# Patient Record
Sex: FEMALE | Race: AMERICAN INDIAN OR ALASKA NATIVE | Employment: FULL TIME | URBAN - METROPOLITAN AREA
[De-identification: names, ages, dates, MRNs, and addresses within clinical notes are randomized per-mention and may not be internally consistent; named-entity substitution may affect disease eponyms.]

---

## 2018-06-01 ENCOUNTER — TRANSFERRED RECORDS (OUTPATIENT)
Dept: HEALTH INFORMATION MANAGEMENT | Facility: CLINIC | Age: 43
End: 2018-06-01

## 2018-08-22 ENCOUNTER — TRANSFERRED RECORDS (OUTPATIENT)
Dept: HEALTH INFORMATION MANAGEMENT | Facility: CLINIC | Age: 43
End: 2018-08-22

## 2018-11-14 ENCOUNTER — TRANSFERRED RECORDS (OUTPATIENT)
Dept: HEALTH INFORMATION MANAGEMENT | Facility: CLINIC | Age: 43
End: 2018-11-14

## 2019-01-23 ENCOUNTER — TRANSFERRED RECORDS (OUTPATIENT)
Dept: HEALTH INFORMATION MANAGEMENT | Facility: CLINIC | Age: 44
End: 2019-01-23

## 2019-03-21 ENCOUNTER — TRANSFERRED RECORDS (OUTPATIENT)
Dept: HEALTH INFORMATION MANAGEMENT | Facility: CLINIC | Age: 44
End: 2019-03-21

## 2019-03-22 ENCOUNTER — TRANSFERRED RECORDS (OUTPATIENT)
Dept: HEALTH INFORMATION MANAGEMENT | Facility: CLINIC | Age: 44
End: 2019-03-22

## 2019-03-23 ENCOUNTER — TRANSFERRED RECORDS (OUTPATIENT)
Dept: HEALTH INFORMATION MANAGEMENT | Facility: CLINIC | Age: 44
End: 2019-03-23

## 2019-04-04 ENCOUNTER — APPOINTMENT (OUTPATIENT)
Dept: CT IMAGING | Facility: CLINIC | Age: 44
DRG: 392 | End: 2019-04-04
Attending: OBSTETRICS & GYNECOLOGY
Payer: COMMERCIAL

## 2019-04-04 ENCOUNTER — ONCOLOGY VISIT (OUTPATIENT)
Dept: ONCOLOGY | Facility: CLINIC | Age: 44
DRG: 392 | End: 2019-04-04
Attending: OBSTETRICS & GYNECOLOGY
Payer: COMMERCIAL

## 2019-04-04 ENCOUNTER — APPOINTMENT (OUTPATIENT)
Dept: GENERAL RADIOLOGY | Facility: CLINIC | Age: 44
DRG: 392 | End: 2019-04-04
Attending: OBSTETRICS & GYNECOLOGY
Payer: COMMERCIAL

## 2019-04-04 ENCOUNTER — DOCUMENTATION ONLY (OUTPATIENT)
Dept: ONCOLOGY | Facility: CLINIC | Age: 44
End: 2019-04-04

## 2019-04-04 ENCOUNTER — HOSPITAL ENCOUNTER (INPATIENT)
Facility: CLINIC | Age: 44
LOS: 4 days | Discharge: HOME | DRG: 392 | End: 2019-04-08
Attending: OBSTETRICS & GYNECOLOGY | Admitting: OBSTETRICS & GYNECOLOGY
Payer: COMMERCIAL

## 2019-04-04 ENCOUNTER — TELEPHONE (OUTPATIENT)
Dept: ONCOLOGY | Facility: CLINIC | Age: 44
End: 2019-04-04

## 2019-04-04 VITALS
HEIGHT: 67 IN | WEIGHT: 144.7 LBS | DIASTOLIC BLOOD PRESSURE: 105 MMHG | SYSTOLIC BLOOD PRESSURE: 139 MMHG | BODY MASS INDEX: 22.71 KG/M2 | OXYGEN SATURATION: 98 % | HEART RATE: 70 BPM | TEMPERATURE: 98.1 F

## 2019-04-04 DIAGNOSIS — K56.609 INTESTINAL OBSTRUCTION, UNSPECIFIED CAUSE, UNSPECIFIED WHETHER PARTIAL OR COMPLETE (H): ICD-10-CM

## 2019-04-04 DIAGNOSIS — N80.9 ENDOMETRIOSIS: Primary | ICD-10-CM

## 2019-04-04 DIAGNOSIS — R11.2 NAUSEA AND VOMITING, INTRACTABILITY OF VOMITING NOT SPECIFIED, UNSPECIFIED VOMITING TYPE: Primary | ICD-10-CM

## 2019-04-04 LAB
ALBUMIN SERPL-MCNC: 3.6 G/DL (ref 3.4–5)
ALP SERPL-CCNC: 84 U/L (ref 40–150)
ALT SERPL W P-5'-P-CCNC: 16 U/L (ref 0–50)
ANION GAP SERPL CALCULATED.3IONS-SCNC: 9 MMOL/L (ref 3–14)
AST SERPL W P-5'-P-CCNC: 17 U/L (ref 0–45)
BASOPHILS # BLD AUTO: 0 10E9/L (ref 0–0.2)
BASOPHILS NFR BLD AUTO: 0.3 %
BILIRUB SERPL-MCNC: 0.8 MG/DL (ref 0.2–1.3)
BUN SERPL-MCNC: 12 MG/DL (ref 7–30)
CALCIUM SERPL-MCNC: 8.8 MG/DL (ref 8.5–10.1)
CHLORIDE SERPL-SCNC: 107 MMOL/L (ref 94–109)
CO2 SERPL-SCNC: 22 MMOL/L (ref 20–32)
CREAT SERPL-MCNC: 0.67 MG/DL (ref 0.52–1.04)
DIFFERENTIAL METHOD BLD: NORMAL
EOSINOPHIL # BLD AUTO: 0.2 10E9/L (ref 0–0.7)
EOSINOPHIL NFR BLD AUTO: 2.3 %
ERYTHROCYTE [DISTWIDTH] IN BLOOD BY AUTOMATED COUNT: 12.7 % (ref 10–15)
GFR SERPL CREATININE-BSD FRML MDRD: >90 ML/MIN/{1.73_M2}
GLUCOSE SERPL-MCNC: 100 MG/DL (ref 70–99)
HCT VFR BLD AUTO: 41.6 % (ref 35–47)
HGB BLD-MCNC: 13.5 G/DL (ref 11.7–15.7)
IMM GRANULOCYTES # BLD: 0.1 10E9/L (ref 0–0.4)
IMM GRANULOCYTES NFR BLD: 0.5 %
LYMPHOCYTES # BLD AUTO: 3.7 10E9/L (ref 0.8–5.3)
LYMPHOCYTES NFR BLD AUTO: 38.1 %
MCH RBC QN AUTO: 29 PG (ref 26.5–33)
MCHC RBC AUTO-ENTMCNC: 32.5 G/DL (ref 31.5–36.5)
MCV RBC AUTO: 89 FL (ref 78–100)
MONOCYTES # BLD AUTO: 0.6 10E9/L (ref 0–1.3)
MONOCYTES NFR BLD AUTO: 5.9 %
NEUTROPHILS # BLD AUTO: 5.1 10E9/L (ref 1.6–8.3)
NEUTROPHILS NFR BLD AUTO: 52.9 %
NRBC # BLD AUTO: 0 10*3/UL
NRBC BLD AUTO-RTO: 0 /100
PLATELET # BLD AUTO: 312 10E9/L (ref 150–450)
POTASSIUM SERPL-SCNC: 3.5 MMOL/L (ref 3.4–5.3)
PROT SERPL-MCNC: 7.3 G/DL (ref 6.8–8.8)
RBC # BLD AUTO: 4.66 10E12/L (ref 3.8–5.2)
SODIUM SERPL-SCNC: 138 MMOL/L (ref 133–144)
WBC # BLD AUTO: 9.6 10E9/L (ref 4–11)

## 2019-04-04 PROCEDURE — 12000001 ZZH R&B MED SURG/OB UMMC

## 2019-04-04 PROCEDURE — 40000556 ZZH STATISTIC PERIPHERAL IV START W US GUIDANCE

## 2019-04-04 PROCEDURE — 80053 COMPREHEN METABOLIC PANEL: CPT | Performed by: STUDENT IN AN ORGANIZED HEALTH CARE EDUCATION/TRAINING PROGRAM

## 2019-04-04 PROCEDURE — 25800030 ZZH RX IP 258 OP 636: Performed by: STUDENT IN AN ORGANIZED HEALTH CARE EDUCATION/TRAINING PROGRAM

## 2019-04-04 PROCEDURE — 99205 OFFICE O/P NEW HI 60 MIN: CPT | Mod: ZP | Performed by: OBSTETRICS & GYNECOLOGY

## 2019-04-04 PROCEDURE — 86901 BLOOD TYPING SEROLOGIC RH(D): CPT | Performed by: STUDENT IN AN ORGANIZED HEALTH CARE EDUCATION/TRAINING PROGRAM

## 2019-04-04 PROCEDURE — 25000128 H RX IP 250 OP 636: Performed by: STUDENT IN AN ORGANIZED HEALTH CARE EDUCATION/TRAINING PROGRAM

## 2019-04-04 PROCEDURE — 86900 BLOOD TYPING SEROLOGIC ABO: CPT | Performed by: STUDENT IN AN ORGANIZED HEALTH CARE EDUCATION/TRAINING PROGRAM

## 2019-04-04 PROCEDURE — 86850 RBC ANTIBODY SCREEN: CPT | Performed by: STUDENT IN AN ORGANIZED HEALTH CARE EDUCATION/TRAINING PROGRAM

## 2019-04-04 PROCEDURE — 99221 1ST HOSP IP/OBS SF/LOW 40: CPT | Mod: AI | Performed by: OBSTETRICS & GYNECOLOGY

## 2019-04-04 PROCEDURE — 36415 COLL VENOUS BLD VENIPUNCTURE: CPT | Performed by: STUDENT IN AN ORGANIZED HEALTH CARE EDUCATION/TRAINING PROGRAM

## 2019-04-04 PROCEDURE — 40000986 XR ABDOMEN PORT 1 VW

## 2019-04-04 PROCEDURE — 74177 CT ABD & PELVIS W/CONTRAST: CPT

## 2019-04-04 PROCEDURE — 71275 CT ANGIOGRAPHY CHEST: CPT

## 2019-04-04 PROCEDURE — 85025 COMPLETE CBC W/AUTO DIFF WBC: CPT | Performed by: STUDENT IN AN ORGANIZED HEALTH CARE EDUCATION/TRAINING PROGRAM

## 2019-04-04 RX ORDER — LIDOCAINE 40 MG/G
CREAM TOPICAL
Status: DISCONTINUED | OUTPATIENT
Start: 2019-04-04 | End: 2019-04-08 | Stop reason: HOSPADM

## 2019-04-04 RX ORDER — NALOXONE HYDROCHLORIDE 0.4 MG/ML
.1-.4 INJECTION, SOLUTION INTRAMUSCULAR; INTRAVENOUS; SUBCUTANEOUS
Status: DISCONTINUED | OUTPATIENT
Start: 2019-04-04 | End: 2019-04-05

## 2019-04-04 RX ORDER — ONDANSETRON 4 MG/1
4 TABLET, ORALLY DISINTEGRATING ORAL EVERY 6 HOURS PRN
Status: DISCONTINUED | OUTPATIENT
Start: 2019-04-04 | End: 2019-04-08 | Stop reason: HOSPADM

## 2019-04-04 RX ORDER — HYDROMORPHONE HCL/0.9% NACL/PF 0.2MG/0.2
0.2 SYRINGE (ML) INTRAVENOUS
Status: DISCONTINUED | OUTPATIENT
Start: 2019-04-04 | End: 2019-04-05

## 2019-04-04 RX ORDER — LEVOTHYROXINE SODIUM 75 UG/1
75 TABLET ORAL
Status: DISCONTINUED | OUTPATIENT
Start: 2019-04-05 | End: 2019-04-08 | Stop reason: HOSPADM

## 2019-04-04 RX ORDER — ATENOLOL 50 MG/1
50 TABLET ORAL DAILY
Status: DISCONTINUED | OUTPATIENT
Start: 2019-04-05 | End: 2019-04-08 | Stop reason: HOSPADM

## 2019-04-04 RX ORDER — LORAZEPAM 2 MG/ML
0.5 INJECTION INTRAMUSCULAR
Status: COMPLETED | OUTPATIENT
Start: 2019-04-04 | End: 2019-04-04

## 2019-04-04 RX ORDER — LEVOTHYROXINE SODIUM 75 UG/1
75 TABLET ORAL
COMMUNITY

## 2019-04-04 RX ORDER — HYDROCODONE BITARTRATE AND ACETAMINOPHEN 5; 325 MG/1; MG/1
TABLET ORAL
Refills: 0 | Status: ON HOLD | COMMUNITY
Start: 2018-07-09 | End: 2019-04-08

## 2019-04-04 RX ORDER — ATENOLOL 50 MG/1
50 TABLET ORAL DAILY
COMMUNITY

## 2019-04-04 RX ORDER — ONDANSETRON 2 MG/ML
4 INJECTION INTRAMUSCULAR; INTRAVENOUS EVERY 6 HOURS PRN
Status: DISCONTINUED | OUTPATIENT
Start: 2019-04-04 | End: 2019-04-08 | Stop reason: HOSPADM

## 2019-04-04 RX ORDER — IOPAMIDOL 755 MG/ML
86 INJECTION, SOLUTION INTRAVASCULAR ONCE
Status: COMPLETED | OUTPATIENT
Start: 2019-04-04 | End: 2019-04-04

## 2019-04-04 RX ADMIN — ONDANSETRON 4 MG: 2 INJECTION INTRAMUSCULAR; INTRAVENOUS at 20:15

## 2019-04-04 RX ADMIN — Medication 0.2 MG: at 22:51

## 2019-04-04 RX ADMIN — IOPAMIDOL 86 ML: 755 INJECTION, SOLUTION INTRAVENOUS at 22:35

## 2019-04-04 RX ADMIN — DEXTROSE AND SODIUM CHLORIDE: 5; 900 INJECTION, SOLUTION INTRAVENOUS at 22:45

## 2019-04-04 RX ADMIN — LORAZEPAM 0.5 MG: 2 INJECTION INTRAMUSCULAR; INTRAVENOUS at 20:15

## 2019-04-04 RX ADMIN — Medication 0.2 MG: at 20:15

## 2019-04-04 SDOH — HEALTH STABILITY: MENTAL HEALTH: HOW OFTEN DO YOU HAVE A DRINK CONTAINING ALCOHOL?: NEVER

## 2019-04-04 ASSESSMENT — ACTIVITIES OF DAILY LIVING (ADL)
ADLS_ACUITY_SCORE: 11
TRANSFERRING: 0-->INDEPENDENT
DRESS: 0-->INDEPENDENT
AMBULATION: 0-->INDEPENDENT
RETIRED_EATING: 0-->INDEPENDENT
SWALLOWING: 0-->SWALLOWS FOODS/LIQUIDS WITHOUT DIFFICULTY
BATHING: 0-->INDEPENDENT
TOILETING: 0-->INDEPENDENT
FALL_HISTORY_WITHIN_LAST_SIX_MONTHS: NO
COGNITION: 0 - NO COGNITION ISSUES REPORTED
RETIRED_COMMUNICATION: 0-->UNDERSTANDS/COMMUNICATES WITHOUT DIFFICULTY

## 2019-04-04 ASSESSMENT — MIFFLIN-ST. JEOR
SCORE: 1341.74
SCORE: 1346.59

## 2019-04-04 ASSESSMENT — PAIN SCALES - GENERAL: PAINLEVEL: EXTREME PAIN (8)

## 2019-04-04 NOTE — NURSING NOTE
"Oncology Rooming Note    April 4, 2019 4:16 PM   Clarissa Myles is a 43 year old female who presents for:    Chief Complaint   Patient presents with     Oncology Clinic Visit     New; Endometriosis     Initial Vitals: BP (!) 139/105   Pulse 70   Temp 98.1  F (36.7  C) (Oral)   Ht 1.69 m (5' 6.54\")   Wt 65.6 kg (144 lb 11.2 oz)   SpO2 98%   BMI 22.98 kg/m   Estimated body mass index is 22.98 kg/m  as calculated from the following:    Height as of this encounter: 1.69 m (5' 6.54\").    Weight as of this encounter: 65.6 kg (144 lb 11.2 oz). Body surface area is 1.75 meters squared.  Extreme Pain (8) Comment: Data Unavailable   No LMP recorded.  Allergies reviewed: Yes  Medications reviewed: Yes    Medications: Medication refills not needed today.  Pharmacy name entered into EPIC: [facility]      Clinical concerns: Pt here to discuss possible surgery. Dr Franco was notified.      Adeline Pierre CMA              "

## 2019-04-04 NOTE — PROGRESS NOTES
Consult Notes on Referred Patient    Date: 2019     Dr. Deras Not In System      RE: Clarissa Myles  : 1975  SARAH: 2019    Dear  Provider Not In System:    I had the pleasure of seeing your patient Clarissa Myles here at the Gynecologic Cancer Clinic at the [facility] on 2019.  She presents with her brother and sister-in-law for the consult.    She is anxious and teary complaining of abdominal pain and fatigue.    Her history is notable for long standing diagnosis of pelvic pain and dysmenorrhea. She , her child was born many years ago and she had secondary infertility. Starting early 2018 she began to experience increasing signs of bowel obstruction, a  lower GI endoscopy in summer 2018 was unsuccessful. Ultimately in 2018 she underwent bowel resection and colostomy, diagnosed with endometriosis.      In 2018, she underwent another surgery with take down of the colostomy and additional bowel resection. Extensive endometriosis on her bowel was noted at that time and she was referred to Gynecology. Lupon was started at the end of Dec 2018. She reports her dysmenorrhea has improved since starting the Lupron.  Last week she started to develop nausea and abdominal pain. She was hospitalized at local hospital, placed NPO and ultimately discharged with plan to follow up at the [facility]. There was concern for another developing bowel obstruction thought to be due to extensive endometriosis on her bowel. Due to her surgical history and endometriosis, she was referred to Gyn Oncology at [facility] to discuss surgical management for endometriosis with total hysterectomy and bilateral salpingo-oophorectomy.      Calrissa reports she has nausea today, no vomiting. Cramping abdominal pain is present. No flatus. She was able to eat a few bites of food this AM but has not been eating regularly. She also reports feeling a little short of breath.   Reports she has not  had normal bowel movement for nearly 3 weeks.    Her family reports they are frustrated with seeing her so sick. They drove 8-10 hours today.       Review of Systems:    Systemic           yes - weight loss, fatigue, no fever  Skin           no rashes, or lesions  Eye           no irritation; no changes in vision  Donnell-Laryngeal           no dysphagia; no hoarseness   Pulmonary    no cough; no shortness of breath  Cardiovascular    no chest pain; no palpitations  Gastrointestinal    yes - + nausea,  po intake, unable to tolerate solid foods  Genitourinary   no urinary frequency; no urinary urgency; no dysuria; no pain; no abnormal vaginal discharge; no abnormal vaginal bleeding  Breast   no breast discharge; no breast changes; no breast pain  Musculoskeletal    no myalgias; no arthralgias; no back pain  Psychiatric           yes - anxiety, diagnosis of bipolar disorder currently denies symptoms but not taking medication    Hematologic           no tender lymph nodes; no noticeable swellings or lumps   Endocrine    no hot flashes; no heat/cold intolerance         Neurological   no tremor; no numbness and tingling; no headaches; no difficulty  sleeping      Past Medical History:    Past Medical History:   Diagnosis Date     Asthma      Bipolar disorder (H)      Endometriosis      GERD (gastroesophageal reflux disease)      Hypertension      Hypothyroid          Past Surgical History:    No past surgical history on file. Bowel resection and colostomy takedown 2018      Health Maintenance:  Health Maintenance Due   Topic Date Due     PREVENTIVE CARE VISIT  1975     PHQ-2 Q1 YR  1989     HIV SCREEN (SYSTEM ASSIGNED)  1995     PAP SCREENING Q3 YR (SYSTEM ASSIGNED)  1998     DTAP/TDAP/TD IMMUNIZATION (1 - Tdap) 2002     INFLUENZA VACCINE (1) 2018           Current Medications:     has a current medication list which includes the following prescription(s): atenolol, levothyroxine,  "acetaminophen, docusate sodium, gabapentin, omeprazole, ondansetron, oxycodone, polyethylene glycol, and quetiapine.       Allergies:     No Known Allergies         Social History:     Social History     Tobacco Use     Smoking status: Former Smoker     Smokeless tobacco: Never Used   Substance Use Topics     Alcohol use: No     Frequency: Never       History   Drug Use Not on file         Family History:       No family history on file. Denies family history of breast or ovarian cancer      Physical Exam:     BP (!) 139/105   Pulse 70   Temp 98.1  F (36.7  C) (Oral)   Ht 1.69 m (5' 6.54\")   Wt 65.6 kg (144 lb 11.2 oz)   SpO2 98%   BMI 22.98 kg/m    Body mass index is 22.98 kg/m .    General Appearance:  alert, no distress, fatigued and does not make eye contact     HEENT:  no thyromegaly, no palpable nodules or masses        Cardiovascular: regular rate and rhythm, no gallops, rubs or murmurs     Respiratory: lungs clear, no rales, rhonchi or wheezes, normal diaphragmatic excursion    Musculoskeletal: extremities non tender and without edema    Skin: no lesions or rashes     Neurological: normal gait, no gross defects     Psychiatric: appropriate mood and affect, soft spoken             Gastrointestinal:       Abdomen soft, decreased BS, distended along right aspect of abdomen, no mass      Genitourinary: Deferred      Assessment:    Clarissa Myles is a 43 year old woman with long standing history of endometriosis and bowel obstruction due to endometriosis.    Patient presents with nausea and abdominal discomfort and signs of obstruction.    I discussed in detail with patient and family that we need to further work up the degree of bowel obstruction and need for surgical intervention at this time. If there is a need for surgery, would agree that bilateral oophorectomy is reasonable.     Reviewed risks and benefits of oophorectomy.    Admit to Gyn Oncology service for complete work, hydration and will evaluate " further with coordination with colorectal surgery.        Thank you for allowing us to participate in the care of your patient.         Sincerely,      Jennyfer Franco M.D., MPH,  F.A.C.O.G.  Division of Gynecologic Oncology        A total of 60 minutes was spent with the patient, ? 50% of time was spent in counseling the patient and/or treatment planning.              CC  Patient Care Team:  No Ref-Primary, Physician as PCP - General  PROVIDER NOT IN SYSTEM

## 2019-04-04 NOTE — LETTER
2019       RE: Clarissa Myles  [address]  [facility] [address]     Dear Colleague,    Thank you for referring your patient, Clarissa Myles, to the [facility]. Please see a copy of my visit note below.                            Consult Notes on Referred Patient    Date: 2019      Provider Not In System      RE: Clarissa Myles  : 1975  SARAH: 2019    Dear  Provider Not In System:    I had the pleasure of seeing your patient Clarissa Myles here at the Gynecologic Cancer Clinic at the [facility] on 2019.  She presents with her brother and sister-in-law for the consult.    She is anxious and teary complaining of abdominal pain and fatigue.    Her history is notable for long standing diagnosis of pelvic pain and dysmenorrhea. She , her child was born many years ago and she had secondary infertility. Starting early 2018 she began to experience increasing signs of bowel obstruction, a  lower GI endoscopy in summer 2018 was unsuccessful. Ultimately in 2018 she underwent bowel resection and colostomy, diagnosed with endometriosis.      In 2018, she underwent another surgery with take down of the colostomy and additional bowel resection. Extensive endometriosis on her bowel was noted at that time and she was referred to Gynecology. Lupon was started at the end of Dec 2018. She reports her dysmenorrhea has improved since starting the Lupron.  Last week she started to develop nausea and abdominal pain. She was hospitalized at local hospital, placed NPO and ultimately discharged with plan to follow up at the [facility]. There was concern for another developing bowel obstruction thought to be due to extensive endometriosis on her bowel. Due to her surgical history and endometriosis, she was referred to Gyn Oncology at [facility] to discuss surgical management for endometriosis with total hysterectomy and bilateral salpingo-oophorectomy.      Clarissa reports she has nausea  today, no vomiting. Cramping abdominal pain is present. No flatus. She was able to eat a few bites of food this AM but has not been eating regularly. She also reports feeling a little short of breath.   Reports she has not had normal bowel movement for nearly 3 weeks.    Her family reports they are frustrated with seeing her so sick. They drove 8-10 hours today.       Review of Systems:    Systemic           yes - weight loss, fatigue, no fever  Skin           no rashes, or lesions  Eye           no irritation; no changes in vision  Donnell-Laryngeal           no dysphagia; no hoarseness   Pulmonary    no cough; no shortness of breath  Cardiovascular    no chest pain; no palpitations  Gastrointestinal    yes - + nausea,  po intake, unable to tolerate solid foods  Genitourinary   no urinary frequency; no urinary urgency; no dysuria; no pain; no abnormal vaginal discharge; no abnormal vaginal bleeding  Breast   no breast discharge; no breast changes; no breast pain  Musculoskeletal    no myalgias; no arthralgias; no back pain  Psychiatric           yes - anxiety, diagnosis of bipolar disorder currently denies symptoms but not taking medication    Hematologic           no tender lymph nodes; no noticeable swellings or lumps   Endocrine    no hot flashes; no heat/cold intolerance         Neurological   no tremor; no numbness and tingling; no headaches; no difficulty  sleeping      Past Medical History:    Past Medical History:   Diagnosis Date     Asthma      Bipolar disorder (H)      Endometriosis      GERD (gastroesophageal reflux disease)      Hypertension      Hypothyroid          Past Surgical History:    No past surgical history on file. Bowel resection and colostomy takedown 2018      Health Maintenance:  Health Maintenance Due   Topic Date Due     PREVENTIVE CARE VISIT  1975     PHQ-2 Q1 YR  1989     HIV SCREEN (SYSTEM ASSIGNED)  1995     PAP SCREENING Q3 YR (SYSTEM ASSIGNED)  1998  "    DTAP/TDAP/TD IMMUNIZATION (1 - Tdap) 08/08/2002     INFLUENZA VACCINE (1) 09/01/2018           Current Medications:     has a current medication list which includes the following prescription(s): atenolol, levothyroxine, acetaminophen, docusate sodium, gabapentin, omeprazole, ondansetron, oxycodone, polyethylene glycol, and quetiapine.       Allergies:     No Known Allergies         Social History:     Social History     Tobacco Use     Smoking status: Former Smoker     Smokeless tobacco: Never Used   Substance Use Topics     Alcohol use: No     Frequency: Never       History   Drug Use Not on file         Family History:       No family history on file. Denies family history of breast or ovarian cancer      Physical Exam:     BP (!) 139/105   Pulse 70   Temp 98.1  F (36.7  C) (Oral)   Ht 1.69 m (5' 6.54\")   Wt 65.6 kg (144 lb 11.2 oz)   SpO2 98%   BMI 22.98 kg/m     Body mass index is 22.98 kg/m .    General Appearance:  alert, no distress, fatigued and does not make eye contact     HEENT:  no thyromegaly, no palpable nodules or masses        Cardiovascular: regular rate and rhythm, no gallops, rubs or murmurs     Respiratory: lungs clear, no rales, rhonchi or wheezes, normal diaphragmatic excursion    Musculoskeletal: extremities non tender and without edema    Skin: no lesions or rashes     Neurological: normal gait, no gross defects     Psychiatric: appropriate mood and affect, soft spoken             Gastrointestinal:       Abdomen soft, decreased BS, distended along right aspect of abdomen, no mass      Genitourinary: Deferred      Assessment:    Clarissa Myles is a 43 year old woman with long standing history of endometriosis and bowel obstruction due to endometriosis.    Patient presents with nausea and abdominal discomfort and signs of obstruction.    I discussed in detail with patient and family that we need to further work up the degree of bowel obstruction and need for surgical intervention at " this time. If there is a need for surgery, would agree that bilateral oophorectomy is reasonable.     Reviewed risks and benefits of oophorectomy.    Admit to Gyn Oncology service for complete work, hydration and will evaluate further with coordination with colorectal surgery.        Thank you for allowing us to participate in the care of your patient.         Sincerely,      Jennyfer Franco M.D., MPH,  F.A.C.O.G.  Division of Gynecologic Oncology        A total of 60 minutes was spent with the patient, ? 50% of time was spent in counseling the patient and/or treatment planning.              CC  Patient Care Team:  No Ref-Primary, Physician as PCP - General  PROVIDER NOT IN SYSTEM

## 2019-04-04 NOTE — TELEPHONE ENCOUNTER
Patient seen in clinic by Dr Franco.     Direct admit to 7C at [facility]     Gyn/Onc Fellow aware. 7C workroom updated.     Report called to floor.     Patient updated on plan and directions to the hospital discussed.     Itzel Davis RN

## 2019-04-04 NOTE — LETTER
[address] [address]  [phone]          April 8, 2019    RE:  Clarissa Myles                                                                                                                                                       [address]  [facility] [address]            To whom it may concern:    Clarissa Myles is under Dr Franco's professional care. She was hospitalized from 4/4/19 - 4/8/19. She  may return to work 4/15/19.     Sincerely,         Hansa Amaya DNP, CNP

## 2019-04-05 LAB
ABO + RH BLD: NORMAL
ABO + RH BLD: NORMAL
ALBUMIN SERPL-MCNC: 3.4 G/DL (ref 3.4–5)
ALP SERPL-CCNC: 75 U/L (ref 40–150)
ALT SERPL W P-5'-P-CCNC: 16 U/L (ref 0–50)
ANION GAP SERPL CALCULATED.3IONS-SCNC: 7 MMOL/L (ref 3–14)
AST SERPL W P-5'-P-CCNC: 13 U/L (ref 0–45)
BASOPHILS # BLD AUTO: 0 10E9/L (ref 0–0.2)
BASOPHILS NFR BLD AUTO: 0.3 %
BILIRUB SERPL-MCNC: 0.7 MG/DL (ref 0.2–1.3)
BLD GP AB SCN SERPL QL: NORMAL
BLOOD BANK CMNT PATIENT-IMP: NORMAL
BUN SERPL-MCNC: 10 MG/DL (ref 7–30)
CALCIUM SERPL-MCNC: 8.3 MG/DL (ref 8.5–10.1)
CHLORIDE SERPL-SCNC: 106 MMOL/L (ref 94–109)
CO2 SERPL-SCNC: 24 MMOL/L (ref 20–32)
CREAT SERPL-MCNC: 0.67 MG/DL (ref 0.52–1.04)
DIFFERENTIAL METHOD BLD: NORMAL
EOSINOPHIL # BLD AUTO: 0.2 10E9/L (ref 0–0.7)
EOSINOPHIL NFR BLD AUTO: 1.9 %
ERYTHROCYTE [DISTWIDTH] IN BLOOD BY AUTOMATED COUNT: 12.7 % (ref 10–15)
GFR SERPL CREATININE-BSD FRML MDRD: >90 ML/MIN/{1.73_M2}
GLUCOSE SERPL-MCNC: 102 MG/DL (ref 70–99)
HCT VFR BLD AUTO: 38.2 % (ref 35–47)
HGB BLD-MCNC: 12.6 G/DL (ref 11.7–15.7)
IMM GRANULOCYTES # BLD: 0 10E9/L (ref 0–0.4)
IMM GRANULOCYTES NFR BLD: 0.5 %
LYMPHOCYTES # BLD AUTO: 3.2 10E9/L (ref 0.8–5.3)
LYMPHOCYTES NFR BLD AUTO: 35.9 %
MCH RBC QN AUTO: 29.2 PG (ref 26.5–33)
MCHC RBC AUTO-ENTMCNC: 33 G/DL (ref 31.5–36.5)
MCV RBC AUTO: 89 FL (ref 78–100)
MONOCYTES # BLD AUTO: 0.4 10E9/L (ref 0–1.3)
MONOCYTES NFR BLD AUTO: 4.7 %
NEUTROPHILS # BLD AUTO: 5 10E9/L (ref 1.6–8.3)
NEUTROPHILS NFR BLD AUTO: 56.7 %
NRBC # BLD AUTO: 0 10*3/UL
NRBC BLD AUTO-RTO: 0 /100
PLATELET # BLD AUTO: 290 10E9/L (ref 150–450)
POTASSIUM SERPL-SCNC: 3.4 MMOL/L (ref 3.4–5.3)
PROT SERPL-MCNC: 6.8 G/DL (ref 6.8–8.8)
RBC # BLD AUTO: 4.31 10E12/L (ref 3.8–5.2)
SODIUM SERPL-SCNC: 137 MMOL/L (ref 133–144)
SPECIMEN EXP DATE BLD: NORMAL
WBC # BLD AUTO: 8.8 10E9/L (ref 4–11)

## 2019-04-05 PROCEDURE — C9113 INJ PANTOPRAZOLE SODIUM, VIA: HCPCS | Performed by: NURSE PRACTITIONER

## 2019-04-05 PROCEDURE — 25000128 H RX IP 250 OP 636: Performed by: NURSE PRACTITIONER

## 2019-04-05 PROCEDURE — 12000001 ZZH R&B MED SURG/OB UMMC

## 2019-04-05 PROCEDURE — 25000132 ZZH RX MED GY IP 250 OP 250 PS 637: Performed by: STUDENT IN AN ORGANIZED HEALTH CARE EDUCATION/TRAINING PROGRAM

## 2019-04-05 PROCEDURE — 25000128 H RX IP 250 OP 636: Performed by: STUDENT IN AN ORGANIZED HEALTH CARE EDUCATION/TRAINING PROGRAM

## 2019-04-05 PROCEDURE — 85025 COMPLETE CBC W/AUTO DIFF WBC: CPT | Performed by: STUDENT IN AN ORGANIZED HEALTH CARE EDUCATION/TRAINING PROGRAM

## 2019-04-05 PROCEDURE — 25800030 ZZH RX IP 258 OP 636: Performed by: STUDENT IN AN ORGANIZED HEALTH CARE EDUCATION/TRAINING PROGRAM

## 2019-04-05 PROCEDURE — 99232 SBSQ HOSP IP/OBS MODERATE 35: CPT | Mod: GC | Performed by: OBSTETRICS & GYNECOLOGY

## 2019-04-05 PROCEDURE — 25000128 H RX IP 250 OP 636: Performed by: OBSTETRICS & GYNECOLOGY

## 2019-04-05 PROCEDURE — 80053 COMPREHEN METABOLIC PANEL: CPT | Performed by: STUDENT IN AN ORGANIZED HEALTH CARE EDUCATION/TRAINING PROGRAM

## 2019-04-05 PROCEDURE — 36415 COLL VENOUS BLD VENIPUNCTURE: CPT | Performed by: STUDENT IN AN ORGANIZED HEALTH CARE EDUCATION/TRAINING PROGRAM

## 2019-04-05 RX ORDER — DIPHENHYDRAMINE HCL 25 MG
25 CAPSULE ORAL EVERY 6 HOURS PRN
Status: DISCONTINUED | OUTPATIENT
Start: 2019-04-05 | End: 2019-04-08 | Stop reason: HOSPADM

## 2019-04-05 RX ORDER — DIPHENHYDRAMINE HYDROCHLORIDE 50 MG/ML
25 INJECTION INTRAMUSCULAR; INTRAVENOUS EVERY 6 HOURS PRN
Status: DISCONTINUED | OUTPATIENT
Start: 2019-04-05 | End: 2019-04-05

## 2019-04-05 RX ORDER — NALOXONE HYDROCHLORIDE 0.4 MG/ML
.1-.4 INJECTION, SOLUTION INTRAMUSCULAR; INTRAVENOUS; SUBCUTANEOUS
Status: DISCONTINUED | OUTPATIENT
Start: 2019-04-05 | End: 2019-04-08 | Stop reason: HOSPADM

## 2019-04-05 RX ADMIN — ENOXAPARIN SODIUM 40 MG: 40 INJECTION SUBCUTANEOUS at 20:57

## 2019-04-05 RX ADMIN — DEXTROSE AND SODIUM CHLORIDE: 5; 900 INJECTION, SOLUTION INTRAVENOUS at 18:43

## 2019-04-05 RX ADMIN — PANTOPRAZOLE SODIUM 40 MG: 40 INJECTION, POWDER, FOR SOLUTION INTRAVENOUS at 17:18

## 2019-04-05 RX ADMIN — Medication 1 MG: at 01:50

## 2019-04-05 RX ADMIN — DEXTROSE AND SODIUM CHLORIDE: 5; 900 INJECTION, SOLUTION INTRAVENOUS at 08:26

## 2019-04-05 RX ADMIN — ONDANSETRON 4 MG: 2 INJECTION INTRAMUSCULAR; INTRAVENOUS at 17:51

## 2019-04-05 RX ADMIN — Medication 0.2 MG: at 01:32

## 2019-04-05 RX ADMIN — Medication 1 MG: at 20:51

## 2019-04-05 RX ADMIN — BENZOCAINE, MENTHOL 1 LOZENGE: 15; 3.6 LOZENGE ORAL at 15:48

## 2019-04-05 RX ADMIN — Medication: at 14:00

## 2019-04-05 RX ADMIN — Medication 0.2 MG: at 03:44

## 2019-04-05 RX ADMIN — ONDANSETRON 4 MG: 2 INJECTION INTRAMUSCULAR; INTRAVENOUS at 02:26

## 2019-04-05 RX ADMIN — DIPHENHYDRAMINE HYDROCHLORIDE 25 MG: 50 INJECTION INTRAMUSCULAR; INTRAVENOUS at 15:09

## 2019-04-05 RX ADMIN — ATENOLOL 50 MG: 50 TABLET ORAL at 08:01

## 2019-04-05 RX ADMIN — LEVOTHYROXINE SODIUM 75 MCG: 75 TABLET ORAL at 08:01

## 2019-04-05 RX ADMIN — Medication: at 09:38

## 2019-04-05 RX ADMIN — Medication 0.2 MG: at 06:26

## 2019-04-05 RX ADMIN — Medication 0.2 MG: at 08:01

## 2019-04-05 RX ADMIN — PROCHLORPERAZINE EDISYLATE 5 MG: 5 INJECTION INTRAMUSCULAR; INTRAVENOUS at 20:51

## 2019-04-05 ASSESSMENT — PAIN DESCRIPTION - DESCRIPTORS
DESCRIPTORS: CRAMPING
DESCRIPTORS: CRAMPING

## 2019-04-05 ASSESSMENT — ACTIVITIES OF DAILY LIVING (ADL)
ADLS_ACUITY_SCORE: 11

## 2019-04-05 ASSESSMENT — MIFFLIN-ST. JEOR: SCORE: 1339.92

## 2019-04-05 NOTE — H&P
[facility] History and Physical    Clarissa Myles MRN# [id number]   Age: 43 year old YOB: 1975     Date of Admission:  4/4/2019    Primary care provider: Megan Zhao             Chief Complaint:   Nausea, vomiting and abdominal pain         History of Present Illness:   Clarissa Myles is a 43 year old female admitted with nausea, vomiting and abdominal pain suggestive of a small bowel obstruction. She is accompanied by her bother and sister in law. They are at the bedside and supportive.     Clarissa was seen in Dr. Franco's clinic today for evaluation of a total hysterectomy and bilateral salpingo-oophorectomy. Her gyn history is pertinent for endometriosis and chronic pelvic pain. She originally presented to an OSH in June 2018 with a bowel obstruction. Surgical management was recommended and she underwent a bowel resection and colostomy. In Nov 2018, she underwent another surgery with take down of the colostomy and additional bowel resection. Extensive endometriosis on her bowel was noted at that time and she was referred to Gynecology. Lupon was started at the end of Dec 2018. Last week she started to develop nausea and abdominal pain. There was concern for another developing bowel obstruction thought to be due to extensive endometriosis on her bowel. Due to her surgical history and endometriosis, she was referred to Gyn Oncology at [facility] to discuss surgical management for endometriosis with total hysterectomy and bilateral salpingo-oophorectomy.     Clarissa reports she has nausea today, no vomiting. Cramping abdominal pain is present. No flatus. She was able to eat a few bites of food this AM but has not been eating regularly. She also reports feeling a little short of breath.          Cancer Treatment History:     No cancer treatment          Past Medical History:   HTN, hypothyroidism, anxiety, GERD, endometriosis with chronic pelvic pain, asthma         Past Surgical History:   Bowel resection  "with colostomy June 2018  Take down of colostomy and bowel resection Nov 2018         Social History:     Reports smoking 2 cigarettes per day. No alcohol use. She used THC last week for pain control since she did not want to take narcotic pain medication.           Family History:   No family history on file.         Immunizations:     There is no immunization history on file for this patient.         Allergies:   No Known Allergies         Medications:     Levothyroxine, atenolol, norco         Review of Systems:   A 14 point review of systems was completed and was negative except for points mentioned in the HPI.            Physical Exam:     Vitals:    04/04/19 1813   BP: 125/75   Pulse: 85   Resp: 16   Temp: 97.1  F (36.2  C)   TempSrc: Oral   SpO2: 96%   Weight: 64.4 kg (142 lb)   Height: 1.702 m (5' 7\")     General: No acute distress, resting comfortably  CV: RRR, no murmur  Resp: clear to ascultation  Abdomen: well healed vertical midline incision and horizontal 4-5 cm incision at site of previous colostomy. Soft, mildly tender  : deferred  Extremities: well perfused, trace LE edema        Labs and imaging:     CBC, CMP, CT PE, CT abd/pelvis w/ contrast pending       Assessment and Plan:   Assessment:Clarissa Myles is a 43 year old female admitted with nausea, vomiting and abdominal pain suggestive of a small bowel obstruction.    Active Problem List  Endometriosis with pelvic pain  Small bowel obstruction  Hypothyroidism  Hypertension  Anxiety      # Small Bowel Obstruction:  - NPO, IV fluids D5NS 100 ml/hr, NG tube  - CT abd/pelvis with PO contrast   - suspect due to endometriosis implants on the bowel    #Endometriosis w/ pelvic pain:  - Currently on Lupron  - Planning hysterectomy and BSO for management of endometriosis, discussed with patient and she is in agreement. She is aware that she will no long be able to carry a pregnancy.   - Surgery date pending management of small bowel obstruction    # " Shortness of breath:  - CT PE planned    # Hypothyroidism:  - continue home levothyroxine    # Hypertension:  - home atenolol continued    # anxiety:  - no prior to admission medications    Pain: tylenol and IV dilaudid prn  Heme: NI  GI: antiemetics ordered  : spontaneously voiding  ID: NI  PPX: SCDs, lovenox 40 mg daily  Dispo:  Pending clinical course    Patient seen and discussed with Dr. Chago Gaitan, Gyn Onc Fellow.     Shaila Vargas MD PhD  Ob/Gyn PGY-3  4/4/2019 7:36 PM         Provider Disclosure:   I agree with above History, Review of Systems, Physical exam and Plan. I have reviewed the content of the documentation and have edited it as needed. The documentation accurately represents those services and the decisions I have made.     Electronically signed by:   Zhang Umanzor MD   Gynecologic Oncology   [facility]

## 2019-04-05 NOTE — TELEPHONE ENCOUNTER
49 pages of medical records received from [facility] and sent to [facility] urgent scanning at 11:05AM.

## 2019-04-05 NOTE — PLAN OF CARE
AOx4, AVSS on RA, and up ad michael. Abdominal pain controlled with IV dilaudid 0.2 q 2hours given x2 with some relief. Complained of Nausea relieved with zofran. NPO ex meds. NG on LIS. PIV infusing MIIVF at 100ml/hr. Continue to monitor and maintain pain control.

## 2019-04-05 NOTE — PROGRESS NOTES
"SPIRITUAL HEALTH SERVICES  SPIRITUAL ASSESSMENT Progress Note  [facility] 7C     REFERRAL SOURCE: Hospital  request upon admission    Clarissa Myles is at [facility] for a \"bowel block.\" Her understanding from her medical team is \"we need to wait a little and see if the block clears.\" Clarissa is here from [place], [place]. Her bother and sister-in-law drove Clarissa to [facility] and will be staying at a hotel nearby with plans to drive her home following her hospitalization.    Clarissa is [place] from [place] [facility]. She would like to have a traditional smudging ceremony tomorrow at 2 pm with her family present. I have notified the on-call  for tomorrow who will help facilitate this.    PLAN: Smudging ceremony on 4/6/19 with on-call . I will continue to follow for on going spiritual support.    Itzel Parham  Oncology   Pager [phone]    "

## 2019-04-05 NOTE — PROGRESS NOTES
"CLINICAL NUTRITION SERVICES - ASSESSMENT NOTE     Nutrition Prescription    RECOMMENDATIONS FOR MDs/PROVIDERS TO ORDER:  Diet adv v nutrition support within 6-7 days    Malnutrition Status:    Severe malnutrition in the context of acute illness    Recommendations already ordered by Registered Dietitian (RD):  None at this time     Future/Additional Recommendations:  Once able to advance diet, offer supplements and/or scheduled snacks to help increase PO intake.  Monitor weight trends.  Consider MVI with minerals if poor PO intake continues once diet advances.      REASON FOR ASSESSMENT  Clarissa Myles is a/an 43 year old female assessed by the dietitian for Admission Nutrition Risk Screen for unintentional loss of 10# or more in the past two months and reduced oral intake over the last month    NUTRITION HISTORY  Patient on a regular diet PTA.  Has had poor PO intake for the past few weeks and really not eating or drinking much.  Asked pt what she has been trying to eat, but pt did not give any specifics and reiterated that she's just been eating bites of food here and there.     Pt admit with nausea/vomiting and abdominal pain and found to have bowel obstruction. PMH includes endometriosis with chronic pelvic pain.  Pt had a bowel resection with colostomy June 2018 and then colostomy takedown and bowel resection Nov 2018.    CURRENT NUTRITION ORDERS  Diet: NPO  Intake/Tolerance: NPO with NG tube in place    LABS  Labs reviewed    MEDICATIONS  Medications reviewed  - D5 @ 100 mL/hr    ANTHROPOMETRICS  Height: 170.2 cm (5' 7\")  Most Recent Weight: 64.2 kg (141 lb 9.6 oz)    IBW: 61.4 kg   BMI: Normal BMI  Weight History: Pt reports  lbs and has lost some weight over the past few weeks (~6% wt loss)  Wt Readings from Last 10 Encounters:   04/05/19 64.2 kg (141 lb 9.6 oz)   04/04/19 65.6 kg (144 lb 11.2 oz)   06/19/18 65.8 kg (145 lb)      Dosing Weight: 64 kg (actual)    ASSESSED NUTRITION NEEDS  Estimated Energy " Needs: 1550-4099 kcals/day (25 - 30 kcals/kg)  Justification: Maintenance  Estimated Protein Needs: 77-96 grams protein/day (1.2 - 1.5 grams of pro/kg)  Justification: Hypercatabolism with acute illness  Estimated Fluid Needs: 5086-0306 mL/day (1 mL/kcal)   Justification: Maintenance, or other per provider pending fluid status    PHYSICAL FINDINGS  See malnutrition section below.    MALNUTRITION  % Intake: </= 50% for >/= 5 days (severe)  % Weight Loss: > 5% in 1 month (severe)  Subcutaneous Fat Loss: None observed  Muscle Loss: None observed  Fluid Accumulation/Edema: Trace LE edema per provider note today, does not meet criteria  Malnutrition Diagnosis: Severe malnutrition in the context of acute illness    NUTRITION DIAGNOSIS  Inadequate oral intake related to decreased appetite and abdominal pain as evidenced by poor PO intake and 6% wt loss over the past ~3 weeks     INTERVENTIONS  Implementation  Nutrition Education: Provided education on role of RD and nutrition POC.     Goals  Diet adv v nutrition support within 6-7 days.     Monitoring/Evaluation  Progress toward goals will be monitored and evaluated per protocol.     Ena Patel RD, LD  7C RD pager: [phone]

## 2019-04-05 NOTE — PLAN OF CARE
[id number]  Pt admitted for potential bowel obstruction  Hx of endometriosis, anxiety, HTN    AOx4, AVSS on RA, and up ad michael. PIV placed this shift, fluids need to be initiated. NG tube placed, waiting on X-ray results for confirmation of placement. PRN ativan given before placement. PRN dilaudid given x1 for pain with relief and PRN zofran given for nausea with relief. Pt is NPO. Pt left for CT around 2230.     Continue to monitor.

## 2019-04-05 NOTE — PLAN OF CARE
Pt. Up with SBA. NPO. NG clamped  @ 1044, to be clamped for 6 hrs, then measure residual and report to Staff. PCA started for carlene with increase in relief. PCA @ 0.1q 10'. Difficult to assess pain, pt. States pain in URQ, heat applied with relief. Other times pt. States pain is due to 'not eating' . Requests ice chips and sips freq. Voiding spont. Abd soft, hypo BS, - flatus. Enc. Activity.

## 2019-04-05 NOTE — DISCHARGE SUMMARY
Gynecologic Oncology Discharge Summary    Clarissa Mlyes  [id number]    Admit Date: 4/4/2019  Discharge Date: 4/8/2019  Admitting Provider: Zhang Umanzor MD  Discharge Provider: Chanda Madrid MD    Admission Dx:   - Small bowel obstruction  - endometriosis  - pelvic pain  - hypertension  - hypothyroidism  - GERD  - Asthma    Discharge Dx:  - Small bowel obstruction s/p resolution  - endometriosis  - right side pelvic pain  - hypertension  - hypothyroidism  - GERD  - Asthma    Patient Active Problem List   Diagnosis     Bowel obstruction (H)       Procedures:   Placement and removal of NG tube    Prior to Admission Medications:  Medications Prior to Admission   Medication Sig Dispense Refill Last Dose     atenolol (TENORMIN) 50 MG tablet Take 50 mg by mouth daily   Taking     levothyroxine (SYNTHROID/LEVOTHROID) 75 MCG tablet Take 75 mcg by mouth   Taking     [DISCONTINUED] HYDROcodone-acetaminophen (NORCO) 5-325 MG tablet TAKE 1 TO 2 TABLETS BY MOUTH EVERY 4 HOURS AS NEEDED FOR PAIN  0 Taking       Discharge Medications:   JourdanClarissa ludwig   Home Medication Instructions JASPREET:[id number]    Printed on:04/08/19 2546   Medication Information                      acetaminophen (TYLENOL) 325 MG tablet  Take 2 tablets (650 mg) by mouth every 4 hours as needed for mild pain or fever             atenolol (TENORMIN) 50 MG tablet  Take 50 mg by mouth daily             docusate sodium (COLACE) 100 MG capsule  Take 1 capsule (100 mg) by mouth 2 times daily             gabapentin (NEURONTIN) 600 MG tablet  Take 1 tablet (600 mg) by mouth 2 times daily             levothyroxine (SYNTHROID/LEVOTHROID) 75 MCG tablet  Take 75 mcg by mouth             omeprazole (PRILOSEC OTC) 20 MG EC tablet  Take 1 tablet (20 mg) by mouth daily Max of 2 weeks             ondansetron (ZOFRAN-ODT) 4 MG ODT tab  Take 1 tablet (4 mg) by mouth every 6 hours as needed for nausea or vomiting             oxyCODONE (ROXICODONE) 5 MG tablet  Take 1-2 tablets  (5-10 mg) by mouth every 6 hours as needed for moderate to severe pain             polyethylene glycol (MIRALAX/GLYCOLAX) packet  Take 17 g by mouth 2 times daily as needed for constipation             QUEtiapine (SEROQUEL) 50 MG tablet  Take 1 tablet (50 mg) by mouth At Bedtime                 Consultations:  none    Brief History of Illness:  Clarissa Myles is a 43 year old female admitted with nausea, vomiting and abdominal pain suggestive of a small bowel obstruction. She is accompanied by her bother and sister in law. They are at the bedside and supportive.      Clarissa was seen in Dr. Franco's clinic today for evaluation of a total hysterectomy and bilateral salpingo-oophorectomy. Her gyn history is pertinent for endometriosis and chronic pelvic pain. She originally presented to an OSH in June 2018 with a bowel obstruction. Surgical management was recommended and she underwent a bowel resection and colostomy. In Nov 2018, she underwent another surgery with take down of the colostomy and additional bowel resection. Extensive endometriosis on her bowel was noted at that time and she was referred to Gynecology. Lupon was started at the end of Dec 2018. Last week she started to develop nausea and abdominal pain. There was concern for another developing bowel obstruction thought to be due to extensive endometriosis on her bowel. Due to her surgical history and endometriosis, she was referred to Gyn Oncology at [facility] to discuss surgical management for endometriosis with total hysterectomy and bilateral salpingo-oophorectomy.      Clarissa reports she has nausea today, no vomiting. Cramping abdominal pain is present. No flatus. She was able to eat a few bites of food this AM but has not been eating regularly. She also reports feeling a little short of breath.     Hospital Course:  Dz:   - Endometriosis. Partial mechanical bowel obstruction resolved during admission. She will follow up with her local gyn for continuation of  lupron. She will follow up with Dr Franco for hospital follow up in 2-4 weeks.    FEN:   - She was made NPO. IV fluids of D5NS were started and an NG tube was placed. She was maintained on IVF until HD#3, when she had return of bowel function and her NG tube was removed and diet advanced.  By discharge, she was tolerating a regular diet without nausea and vomiting and able to maintain her hydration without IVF supplementation.  Pain:   - Her pain was initially controlled on a dilaudid PCA.  Once tolerating PO pain meds following NG tube removal, she was transitioned to a PO pain regimen.  Her pain was well controlled on this and she was discharged home with tylenol and oxycodone. She will follow up with her PCP or gyn for continued pain controlled.   CV:  - She has a history of hypertension and her home atenolol was continued without incident.    - She had no acute CV issues while inpatient.   PULM:  - She was short of breath on admission and a CT PE was negative for pulmonary embolism. It was attributed to anxiety and pain and improved over her hospital admission with acute event.   HEME:   -  She had no other acute heme issues while in house. Hgb 12.6.   GI:  - she was made NPO and an NG tube was placed on HD#1 for concern for bowel obstruction with persistent pain and poor PO intake and ongoing nausea. Following return of bowel function and improvement in pain on HD#3, the NG tube was clamped and removed.  Her diet was advanced.  On HD#5, she was tolerating a regular diet without nausea and vomiting.  She will be discharged with a bowel regimen to prevent constipation in the postoperative period.  She had no acute GI issues while in house.  :   The patient was voiding spontaneously without difficulty.  She had no acute  issues while in house.  ID:   - The patient was AF during her hospitalization.    ENDO:   - Patient has history of Hypothyroidism and her home levothyroxine was continued.  PSYCH/NEURO:   -  For her history of anxiety, her home seroquel was continued along with benadryl PRN for sleep.   PPX:    - She was given SCDs, IS, PPI and lovenox during her hospital course.  She tolerated these prophylactic interventions without incident.  They were discontinued at the time of her discharge.    Discharge Instructions and Follow up:  Ms. Clarissa Myles was discharged from the hospital with follow up for possible surgical planning vs. Ongoing conservative management.    Discharge Diet: Regular  Discharge Activity: Activity as tolerated  Discharge Follow up: With GYN and PCP for hospital follow up. Dr. Franco in 2-4 weeks    Discharge Disposition:  Discharged to home    Discharge Staff: MD Hansa Castro CNP  4/8/2019 9:58 AM      Provider Disclosure:   I agree with above History, Review of Systems, Physical exam and Plan. I have reviewed the content of the documentation and have edited it as needed. I have personally performed the services documented here and the documentation accurately represents those services and the decisions I have made.     Electronically signed by:   Chanda Madrid MD   Gynecologic Oncology   [facility]

## 2019-04-05 NOTE — PROGRESS NOTES
"GYN ONC PROGRESS NOTE  04/05/19    HD#:2 admitted with a bowel obstruction  Disease: endometriosis    24 hour events:   - Hospital admission  - CT PE performed, awaiting result  - CT abd/pelvis performed awaiting result  - placement of NG tube    Subjective: Patient is feeling ok this AM.  Pain is controlled. No nausea or vomiting, SOB, chest pain or dizziness. No flatus. Voiding spontaneously.     Objective:   Vitals:    04/04/19 1813 04/04/19 2337 04/05/19 0336   BP: 125/75 117/81 129/75   BP Location:  Left arm Left arm   Pulse: 85 64 60   Resp: 16 18 18   Temp: 97.1  F (36.2  C) 95.9  F (35.5  C) 97.4  F (36.3  C)   TempSrc: Oral Oral Oral   SpO2: 96% 98% 99%   Weight: 64.4 kg (142 lb)     Height: 1.702 m (5' 7\")       General: No acute distress, resting comfortably  CV: RRR, no murmur  Resp: clear to ascultation  Abdomen: well healed vertical midline incision and horizontal 4-5 cm incision at site of previous colostomy. Soft, mildly tender  : deferred  Extremities: well perfused, trace LE edema    I/Os  (Yesterday // Since Midnight)  PO 0cc // 0cc  IVF not charted cc // 823 cc  Urine 250 cc since admission // 250 ml   ml out since midnight     New Labs/Imaging-   Results for orders placed or performed during the hospital encounter of 04/04/19 (from the past 24 hour(s))   ABO/Rh type and screen   Result Value Ref Range    ABO O     RH(D) Pos     Antibody Screen Neg     Test Valid Only At          [facility]    Specimen Expires 04/07/2019    CBC with platelets differential   Result Value Ref Range    WBC 9.6 4.0 - 11.0 10e9/L    RBC Count 4.66 3.8 - 5.2 10e12/L    Hemoglobin 13.5 11.7 - 15.7 g/dL    Hematocrit 41.6 35.0 - 47.0 %    MCV 89 78 - 100 fl    MCH 29.0 26.5 - 33.0 pg    MCHC 32.5 31.5 - 36.5 g/dL    RDW 12.7 10.0 - 15.0 %    Platelet Count 312 150 - 450 10e9/L    Diff Method Automated Method     % Neutrophils 52.9 %    % Lymphocytes 38.1 %    % " Monocytes 5.9 %    % Eosinophils 2.3 %    % Basophils 0.3 %    % Immature Granulocytes 0.5 %    Nucleated RBCs 0 0 /100    Absolute Neutrophil 5.1 1.6 - 8.3 10e9/L    Absolute Lymphocytes 3.7 0.8 - 5.3 10e9/L    Absolute Monocytes 0.6 0.0 - 1.3 10e9/L    Absolute Eosinophils 0.2 0.0 - 0.7 10e9/L    Absolute Basophils 0.0 0.0 - 0.2 10e9/L    Abs Immature Granulocytes 0.1 0 - 0.4 10e9/L    Absolute Nucleated RBC 0.0    Comprehensive metabolic panel   Result Value Ref Range    Sodium 138 133 - 144 mmol/L    Potassium 3.5 3.4 - 5.3 mmol/L    Chloride 107 94 - 109 mmol/L    Carbon Dioxide 22 20 - 32 mmol/L    Anion Gap 9 3 - 14 mmol/L    Glucose 100 (H) 70 - 99 mg/dL    Urea Nitrogen 12 7 - 30 mg/dL    Creatinine 0.67 0.52 - 1.04 mg/dL    GFR Estimate >90 >60 mL/min/[1.73_m2]    GFR Estimate If Black >90 >60 mL/min/[1.73_m2]    Calcium 8.8 8.5 - 10.1 mg/dL    Bilirubin Total 0.8 0.2 - 1.3 mg/dL    Albumin 3.6 3.4 - 5.0 g/dL    Protein Total 7.3 6.8 - 8.8 g/dL    Alkaline Phosphatase 84 40 - 150 U/L    ALT 16 0 - 50 U/L    AST 17 0 - 45 U/L   Comprehensive metabolic panel   Result Value Ref Range    Sodium 137 133 - 144 mmol/L    Potassium 3.4 3.4 - 5.3 mmol/L    Chloride 106 94 - 109 mmol/L    Carbon Dioxide 24 20 - 32 mmol/L    Anion Gap 7 3 - 14 mmol/L    Glucose 102 (H) 70 - 99 mg/dL    Urea Nitrogen 10 7 - 30 mg/dL    Creatinine 0.67 0.52 - 1.04 mg/dL    GFR Estimate >90 >60 mL/min/[1.73_m2]    GFR Estimate If Black >90 >60 mL/min/[1.73_m2]    Calcium 8.3 (L) 8.5 - 10.1 mg/dL    Bilirubin Total 0.7 0.2 - 1.3 mg/dL    Albumin 3.4 3.4 - 5.0 g/dL    Protein Total 6.8 6.8 - 8.8 g/dL    Alkaline Phosphatase 75 40 - 150 U/L    ALT 16 0 - 50 U/L    AST 13 0 - 45 U/L   CBC with platelets differential   Result Value Ref Range    WBC 8.8 4.0 - 11.0 10e9/L    RBC Count 4.31 3.8 - 5.2 10e12/L    Hemoglobin 12.6 11.7 - 15.7 g/dL    Hematocrit 38.2 35.0 - 47.0 %    MCV 89 78 - 100 fl    MCH 29.2 26.5 - 33.0 pg    MCHC 33.0 31.5 -  36.5 g/dL    RDW 12.7 10.0 - 15.0 %    Platelet Count 290 150 - 450 10e9/L    Diff Method Automated Method     % Neutrophils 56.7 %    % Lymphocytes 35.9 %    % Monocytes 4.7 %    % Eosinophils 1.9 %    % Basophils 0.3 %    % Immature Granulocytes 0.5 %    Nucleated RBCs 0 0 /100    Absolute Neutrophil 5.0 1.6 - 8.3 10e9/L    Absolute Lymphocytes 3.2 0.8 - 5.3 10e9/L    Absolute Monocytes 0.4 0.0 - 1.3 10e9/L    Absolute Eosinophils 0.2 0.0 - 0.7 10e9/L    Absolute Basophils 0.0 0.0 - 0.2 10e9/L    Abs Immature Granulocytes 0.0 0 - 0.4 10e9/L    Absolute Nucleated RBC 0.0        Pending cultures (date obtained): None    Assessment and plan:Clarissa Myles is a 43 year old female on HD#2 admitted with a bowel obstruction.     Active Problem List  Endometriosis with pelvic pain  Bowel obstruction  Hypothyroidism  Hypertension  Anxiety      # Bowel Obstruction:  - NPO, IV fluids D5NS 100 ml/hr, NG tube  - suspect due to endometriosis implants on the bowel  - CT abd/pelvis with PO contrast performed, awaiting result  - continue conservative management at this time        #Endometriosis w/ pelvic pain:  - Currently on Lupron  - Planning hysterectomy and BSO for management of endometriosis, discussed with patient and she is in agreement. She is aware that she will no long be able to carry a pregnancy.   - Surgery date pending management of small bowel obstruction     # Shortness of breath:  - improved   - CT PE performed awaiting result     # Hypothyroidism:  - continue home levothyroxine     # Hypertension:  - home atenolol continued     # anxiety:  - no prior to admission medications     Pain: tylenol and IV dilaudid prn  Heme: NI  GI: antiemetics ordered  : spontaneously voiding  ID: NI  PPX: SCDs, lovenox 40 mg daily  Dispo:  Pending clinical course    Shaila Vargas MD PhD  Ob/Gyn PGY-3  4/5/2019 6:23 AM    Provider Disclosure:   I agree with above History, Review of Systems, Physical exam and Plan. I have reviewed  the content of the documentation and have edited it as needed. I have seen and personally performed the services documented here and the documentation accurately represents those services and the decisions I have made.     Electronically signed by:   Zhang Umanzor MD   Gynecologic Oncology   [facility]

## 2019-04-06 PROCEDURE — 25000132 ZZH RX MED GY IP 250 OP 250 PS 637: Performed by: STUDENT IN AN ORGANIZED HEALTH CARE EDUCATION/TRAINING PROGRAM

## 2019-04-06 PROCEDURE — 25000128 H RX IP 250 OP 636: Performed by: STUDENT IN AN ORGANIZED HEALTH CARE EDUCATION/TRAINING PROGRAM

## 2019-04-06 PROCEDURE — 25000128 H RX IP 250 OP 636: Performed by: NURSE PRACTITIONER

## 2019-04-06 PROCEDURE — C9113 INJ PANTOPRAZOLE SODIUM, VIA: HCPCS | Performed by: NURSE PRACTITIONER

## 2019-04-06 PROCEDURE — 99232 SBSQ HOSP IP/OBS MODERATE 35: CPT | Mod: GC | Performed by: OBSTETRICS & GYNECOLOGY

## 2019-04-06 PROCEDURE — 12000001 ZZH R&B MED SURG/OB UMMC

## 2019-04-06 RX ORDER — IBUPROFEN 600 MG/1
600 TABLET, FILM COATED ORAL EVERY 6 HOURS PRN
Status: DISCONTINUED | OUTPATIENT
Start: 2019-04-06 | End: 2019-04-08 | Stop reason: HOSPADM

## 2019-04-06 RX ORDER — QUETIAPINE FUMARATE 50 MG/1
50 TABLET, FILM COATED ORAL AT BEDTIME
Status: DISCONTINUED | OUTPATIENT
Start: 2019-04-06 | End: 2019-04-08 | Stop reason: HOSPADM

## 2019-04-06 RX ORDER — GABAPENTIN 600 MG/1
600 TABLET ORAL 2 TIMES DAILY
Status: DISCONTINUED | OUTPATIENT
Start: 2019-04-06 | End: 2019-04-08 | Stop reason: HOSPADM

## 2019-04-06 RX ORDER — OXYCODONE HYDROCHLORIDE 5 MG/1
5-10 TABLET ORAL EVERY 4 HOURS PRN
Status: DISCONTINUED | OUTPATIENT
Start: 2019-04-06 | End: 2019-04-08

## 2019-04-06 RX ORDER — PANTOPRAZOLE SODIUM 40 MG/1
40 TABLET, DELAYED RELEASE ORAL
Status: DISCONTINUED | OUTPATIENT
Start: 2019-04-07 | End: 2019-04-08 | Stop reason: HOSPADM

## 2019-04-06 RX ORDER — ACETAMINOPHEN 325 MG/1
650 TABLET ORAL EVERY 4 HOURS PRN
Status: DISCONTINUED | OUTPATIENT
Start: 2019-04-06 | End: 2019-04-08 | Stop reason: HOSPADM

## 2019-04-06 RX ADMIN — LEVOTHYROXINE SODIUM 75 MCG: 75 TABLET ORAL at 08:48

## 2019-04-06 RX ADMIN — OXYCODONE HYDROCHLORIDE 10 MG: 5 TABLET ORAL at 16:20

## 2019-04-06 RX ADMIN — OXYCODONE HYDROCHLORIDE 10 MG: 5 TABLET ORAL at 20:09

## 2019-04-06 RX ADMIN — ENOXAPARIN SODIUM 40 MG: 40 INJECTION SUBCUTANEOUS at 20:10

## 2019-04-06 RX ADMIN — OXYCODONE HYDROCHLORIDE 10 MG: 5 TABLET ORAL at 09:40

## 2019-04-06 RX ADMIN — PANTOPRAZOLE SODIUM 40 MG: 40 INJECTION, POWDER, FOR SOLUTION INTRAVENOUS at 08:45

## 2019-04-06 RX ADMIN — ATENOLOL 50 MG: 50 TABLET ORAL at 08:48

## 2019-04-06 RX ADMIN — QUETIAPINE 50 MG: 50 TABLET ORAL at 22:00

## 2019-04-06 RX ADMIN — GABAPENTIN 600 MG: 600 TABLET, FILM COATED ORAL at 20:09

## 2019-04-06 RX ADMIN — GABAPENTIN 600 MG: 600 TABLET, FILM COATED ORAL at 10:18

## 2019-04-06 ASSESSMENT — ACTIVITIES OF DAILY LIVING (ADL)
ADLS_ACUITY_SCORE: 11

## 2019-04-06 ASSESSMENT — PAIN DESCRIPTION - DESCRIPTORS: DESCRIPTORS: DISCOMFORT

## 2019-04-06 NOTE — PLAN OF CARE
"A x O. AVSS. 150 residual from MD BONI notified. NG remains unclamped. Pain controlled w/ PCA pump and heat packs. Pt states \"nausea is from hunger and pain\". Nausea somewhat controlled with Zofran and Compazine. Voids spontaneously. Denies passing gas. Continue POC.          "

## 2019-04-06 NOTE — PLAN OF CARE
AVSS. Pain controlled w/ PCA pump and heat packs. Denies nausea. NPO ex small amounts of ice chips, lozenges. NG clamped for 6hrs, LIS for 1 hr, 150ml out, team aware.  Not passing gas, BS hypoactive. Voiding spontaneously, low output, bladder scanned for 49mL, MD notified. Fluids infusing at 100ml/hr. Continue POC.

## 2019-04-06 NOTE — PROGRESS NOTES
GYN ONC PROGRESS NOTE  04/05/19    HD#3 admitted with a bowel obstruction  Disease: endometriosis    24 hour events:   - failed clamp trial, NG output 950 ml overnight    Subjective: Patient is feeling well this morning.  Pain is controlled. No nausea or vomiting overnight. Tolerating NG tube.  No SOB, chest pain or dizziness. No flatus.  Voiding spontaneously.     Objective:   Vitals:    04/05/19 1200 04/05/19 1406 04/05/19 2307 04/06/19 0322   BP:  102/56 117/65 119/70   BP Location:  Right arm Right arm Right arm   Pulse:  58 82 73   Resp:  18 16 16   Temp:  96.5  F (35.8  C) 97.9  F (36.6  C) 97.8  F (36.6  C)   TempSrc:  Oral Axillary Oral   SpO2: 97% 98% 96% 97%   Weight:       Height:         General: No acute distress, resting comfortably  CV: RRR, no murmur   Resp: clear to ascultation in anterior lung fields  Abdomen: well healed vertical midline incision and horizontal 4-5 cm incision at site of previous colostomy. Soft, mildly tender   Extremities: well perfused, trace LE edema      Intake/Output Summary (Last 24 hours) at 4/6/2019 0618  Last data filed at 4/5/2019 2259  Gross per 24 hour   Intake 2423.33 ml   Output 1150 ml   Net 1273.33 ml       Assessment and plan: Clarissa Myles is a 43 year old female on HD#3 admitted with a bowel obstruction.     Active Problem List  Endometriosis with pelvic pain  Bowel obstruction  Hypothyroidism  Hypertension  Anxiety      # Bowel Obstruction:  - NPO, IV fluids D5NS 100 ml/hr, NG tube  - suspect due to endometriosis implants on the bowel  - CT abd/pelvis with PO contrast performed, suggestive of partial mechanical bowel obstruction  - continue conservative management at this time      #Endometriosis w/ pelvic pain:  - Currently on Lupron  - Planning hysterectomy and BSO for management of endometriosis with Dr. Franco on 4/9  - plan to consult colorectal surgery today, for possible combined case on 4/9     # Shortness of breath:  - improved   - CT PE negative on  4/4, incidental bilateral round breast lesions noted, recommended further evaluation or MRI     # Hypothyroidism:  - continue home levothyroxine     # Hypertension:  - home atenolol continued     # anxiety:  - no prior to admission medications  - benadryl prn     #Pain: tylenol and dilaudid PCA, continue to wean PCA setting    #Heme: NI  #GI: IV zofran, IV compazine PRN    : spontaneously voiding  ID: NI  PPX: SCDs, lovenox 40 mg daily  Dispo: Continued inpatient cares    Amy Schumer, MD  Obstetrics and Gynecology, PGY-2  4/6/19 6:20 AM    Provider Disclosure:   I agree with above History, Review of Systems, Physical exam and Plan. I have reviewed the content of the documentation and have edited it as needed. I have seen and personally performed the services documented here and the documentation accurately represents those services and the decisions I have made.     Electronically signed by:   Zhang Umanzor MD   Gynecologic Oncology   [facility]

## 2019-04-06 NOTE — PROGRESS NOTES
GYN ONC PROGRESS NOTE    HD#3 admitted with a bowel obstruction  Disease: endometriosis    24 hour events:   - NG tube removed  - Passed flatus and BM  - Tolerating regular diet  - Transitioned from PCA to PO pain medications      Subjective: Patient is feeling well this morning.  Says that her pain has improved.  She is tolerating a regular diet, she ate [employer] for dinner.  She has had a bowel movement and is passing flatus.  Ambulating without issue.  Voiding spontaneously.    Objective:   Vitals:    04/06/19 1300 04/06/19 1546 04/06/19 2254 04/07/19 0400   BP: 113/65 128/76 134/82 100/64   BP Location: Right arm Right arm Right arm Right arm   Pulse: 61 51 55 75   Resp: 16 16 14 16   Temp: 98.7  F (37.1  C) 97.9  F (36.6  C) 97.9  F (36.6  C) 97.5  F (36.4  C)   TempSrc:  Oral Oral Oral   SpO2: 96% 100% 96% 96%   Weight:       Height:         General: No acute distress, resting comfortably  CV: RRR, no murmur   Resp: clear to ascultation in anterior lung fields  Abdomen: well healed vertical midline incision and horizontal 4-5 cm incision at site of previous colostomy. Soft, mildly tender   Extremities: well perfused, trace LE edema    I/O last 3 completed shifts:  In: 653 [P.O.:650; I.V.:3]  Out: 450 [Urine:450]    Assessment and plan: Clarissa Myles is a 43 year old female on HD#4 admitted with a bowel obstruction.     Active Problem List  Endometriosis with pelvic pain  Bowel obstruction  Hypothyroidism  Hypertension  Anxiety      # Bowel Obstruction:  - Regular diet, s/p NG tube  - suspect due to endometriosis implants on the bowel  - CT abd/pelvis with PO contrast performed, suggestive of partial mechanical bowel obstruction  - continue conservative management at this time, improving   - Pain: continue PO ibuprofen, tylenol and oxycodone      #Endometriosis w/ pelvic pain:  - Currently on Lupron  - Planning hysterectomy and BSO for management of endometriosis with Dr. Franco on 4/9  - plan to consult  colorectal surgery, for possible combined case on 4/9     # Shortness of breath:  - improved   - CT PE negative on 4/4, incidental bilateral round breast lesions noted, recommended further evaluation or MRI     # Hypothyroidism:  - continue home levothyroxine     # Hypertension:  - home atenolol continued     # anxiety:  - Continue prior to admission Seroquel   - benadryl prn     #Pain: tylenol, ibuprofen and oxycodone. S/p PCA    #Heme: NI  #GI: IV zofran, IV compazine PRN    : spontaneously voiding  ID: NI  PPX: SCDs, lovenox 40 mg daily  Dispo: Possibly home     Bridget Harrison MD   OB/GYN Resident PGY-2  4/7/2019 7:04 AM    Provider Disclosure:   I agree with above History, Review of Systems, Physical exam and Plan. I have reviewed the content of the documentation and have edited it as needed. I have seen and personally performed the services documented here and the documentation accurately represents those services and the decisions I have made.     Electronically signed by:   Zhang Umanzor MD   Gynecologic Oncology   [facility]

## 2019-04-06 NOTE — PROGRESS NOTES
"SPIRITUAL HEALTH SERVICES  SPIRITUAL ASSESSMENT Progress Note  [facility] 7C   ON-CALL VISIT    REFERRAL SOURCE: Unit  told me of pt's request for smudging    I met with Clarissa and she said, \"if my family aren't here by now, they must have forgotten\" and said she would like to go ahead with the smudging anyway. We did the smudging, \"I want to offer a bit of a prayer.\"    PLAN: I will let the unit  know of my visit.    Elida Barnes  Chaplain Resident  Pager [phone]    "

## 2019-04-06 NOTE — PLAN OF CARE
Patient passed gas and had a bowel movement early this morning. NG tube discontinued, diet advanced to clear liquids. PCA discontinued, taking oxycodone for pain. Up independently, patient had a smudge ceremony with the  this afternoon.

## 2019-04-07 LAB
CREAT SERPL-MCNC: 0.75 MG/DL (ref 0.52–1.04)
GFR SERPL CREATININE-BSD FRML MDRD: >90 ML/MIN/{1.73_M2}
PLATELET # BLD AUTO: 229 10E9/L (ref 150–450)

## 2019-04-07 PROCEDURE — 36415 COLL VENOUS BLD VENIPUNCTURE: CPT | Performed by: OBSTETRICS & GYNECOLOGY

## 2019-04-07 PROCEDURE — 85049 AUTOMATED PLATELET COUNT: CPT | Performed by: OBSTETRICS & GYNECOLOGY

## 2019-04-07 PROCEDURE — 12000001 ZZH R&B MED SURG/OB UMMC

## 2019-04-07 PROCEDURE — 82565 ASSAY OF CREATININE: CPT | Performed by: OBSTETRICS & GYNECOLOGY

## 2019-04-07 PROCEDURE — 25000128 H RX IP 250 OP 636: Performed by: STUDENT IN AN ORGANIZED HEALTH CARE EDUCATION/TRAINING PROGRAM

## 2019-04-07 PROCEDURE — 99232 SBSQ HOSP IP/OBS MODERATE 35: CPT | Mod: GC | Performed by: OBSTETRICS & GYNECOLOGY

## 2019-04-07 PROCEDURE — 25000132 ZZH RX MED GY IP 250 OP 250 PS 637: Performed by: STUDENT IN AN ORGANIZED HEALTH CARE EDUCATION/TRAINING PROGRAM

## 2019-04-07 RX ADMIN — LEVOTHYROXINE SODIUM 75 MCG: 75 TABLET ORAL at 07:52

## 2019-04-07 RX ADMIN — ENOXAPARIN SODIUM 40 MG: 40 INJECTION SUBCUTANEOUS at 20:25

## 2019-04-07 RX ADMIN — ACETAMINOPHEN 650 MG: 325 TABLET, FILM COATED ORAL at 17:43

## 2019-04-07 RX ADMIN — OXYCODONE HYDROCHLORIDE 10 MG: 5 TABLET ORAL at 20:31

## 2019-04-07 RX ADMIN — GABAPENTIN 600 MG: 600 TABLET, FILM COATED ORAL at 20:25

## 2019-04-07 RX ADMIN — OXYCODONE HYDROCHLORIDE 10 MG: 5 TABLET ORAL at 16:34

## 2019-04-07 RX ADMIN — PANTOPRAZOLE SODIUM 40 MG: 40 TABLET, DELAYED RELEASE ORAL at 07:51

## 2019-04-07 RX ADMIN — OXYCODONE HYDROCHLORIDE 10 MG: 5 TABLET ORAL at 12:25

## 2019-04-07 RX ADMIN — GABAPENTIN 600 MG: 600 TABLET, FILM COATED ORAL at 07:51

## 2019-04-07 ASSESSMENT — ACTIVITIES OF DAILY LIVING (ADL)
ADLS_ACUITY_SCORE: 11

## 2019-04-07 NOTE — PLAN OF CARE
Patient is tolerating a regular diet but having some gas pain this afternoon. Encouraged patient to walk in halls, she tends to stay in bed in her room, also encouraged fluids. Possible discharge tomorrow. Atenolol held as BP's low today. Patient voiding, forgets to save and measure. Patient spoke to attending MD regarding having surgery or not, will continue to watch overnight.

## 2019-04-07 NOTE — PLAN OF CARE
AVSS. Pain controlled w/ PRN oxycodone, tylenol. Tolerating regular diet. Denies nausea. Voiding spontaneously, remind pt to save urine in hat. BM 4/6, passing gas. Up independently.PIV SL. Continue POC

## 2019-04-07 NOTE — DOWNTIME EVENT NOTE
The EMR was down for 3 hours on 4/7/2019.    Jorge Dietrich was responsible for completing the paper charting during this time period.     The following information was re-entered into the system by Jorge Dietrich: none    The following information will remain in the paper chart: none    Jorge Dietrich  4/7/2019

## 2019-04-07 NOTE — PLAN OF CARE
A x O. AVSS. Taking oxycodone for pain. Denies n/v. Advanced to regular diet, tolerating well. Passing gas/ +Bm. Voids spontaneously. Up independently. PIV SL.Continue POC.

## 2019-04-07 NOTE — PROGRESS NOTES
Patient VS taken at 0727, 0803, 1100.  BP low at 0727 91/57: atenolol held, physician notified. /67 at 1100.  Patient showered and walked one time around the unit. Encouraged more fluid intake and walking. Very independent. Patient had visitors, brother and sister-in-law. Patient meeting with physician to talk about future surgery.  Patient stated that she usually wears glasses, but they were damaged on the way to the hospital.   Patient stated that Seroquel did not help with sleep. Patient open to exploring other options. Continue to monitor BP and attend to needs of the patient.

## 2019-04-08 ENCOUNTER — HOSPITAL ENCOUNTER (INPATIENT)
Facility: CLINIC | Age: 44
Setting detail: SURGERY ADMIT
End: 2019-04-08
Attending: OBSTETRICS & GYNECOLOGY | Admitting: OBSTETRICS & GYNECOLOGY
Payer: COMMERCIAL

## 2019-04-08 ENCOUNTER — ANESTHESIA EVENT (OUTPATIENT)
Dept: SURGERY | Facility: CLINIC | Age: 44
End: 2019-04-08

## 2019-04-08 VITALS
WEIGHT: 141.6 LBS | HEART RATE: 77 BPM | OXYGEN SATURATION: 95 % | RESPIRATION RATE: 16 BRPM | TEMPERATURE: 97.1 F | DIASTOLIC BLOOD PRESSURE: 76 MMHG | BODY MASS INDEX: 22.22 KG/M2 | HEIGHT: 67 IN | SYSTOLIC BLOOD PRESSURE: 113 MMHG

## 2019-04-08 PROCEDURE — 25000132 ZZH RX MED GY IP 250 OP 250 PS 637: Performed by: STUDENT IN AN ORGANIZED HEALTH CARE EDUCATION/TRAINING PROGRAM

## 2019-04-08 PROCEDURE — 99238 HOSP IP/OBS DSCHRG MGMT 30/<: CPT | Mod: GC | Performed by: OBSTETRICS & GYNECOLOGY

## 2019-04-08 PROCEDURE — 25000132 ZZH RX MED GY IP 250 OP 250 PS 637: Performed by: NURSE PRACTITIONER

## 2019-04-08 RX ORDER — GABAPENTIN 600 MG/1
600 TABLET ORAL 2 TIMES DAILY
COMMUNITY
Start: 2019-04-08

## 2019-04-08 RX ORDER — IBUPROFEN 600 MG/1
600 TABLET, FILM COATED ORAL EVERY 6 HOURS PRN
Qty: 30 TABLET | Refills: 0 | Status: SHIPPED | OUTPATIENT
Start: 2019-04-08 | End: 2019-04-08

## 2019-04-08 RX ORDER — ONDANSETRON 4 MG/1
4 TABLET, ORALLY DISINTEGRATING ORAL EVERY 6 HOURS PRN
Qty: 3 TABLET | Refills: 0 | Status: SHIPPED | OUTPATIENT
Start: 2019-04-08

## 2019-04-08 RX ORDER — POLYETHYLENE GLYCOL 3350 17 G/17G
17 POWDER, FOR SOLUTION ORAL 2 TIMES DAILY PRN
Status: DISCONTINUED | OUTPATIENT
Start: 2019-04-08 | End: 2019-04-08 | Stop reason: HOSPADM

## 2019-04-08 RX ORDER — FENTANYL CITRATE 50 UG/ML
25-50 INJECTION, SOLUTION INTRAMUSCULAR; INTRAVENOUS
Status: CANCELLED | OUTPATIENT
Start: 2019-04-08

## 2019-04-08 RX ORDER — ACETAMINOPHEN 325 MG/1
650 TABLET ORAL EVERY 4 HOURS PRN
Qty: 100 TABLET | Refills: 0 | Status: SHIPPED | OUTPATIENT
Start: 2019-04-08

## 2019-04-08 RX ORDER — DOCUSATE SODIUM 100 MG/1
100 CAPSULE, LIQUID FILLED ORAL 2 TIMES DAILY
Qty: 60 CAPSULE | Refills: 0 | Status: SHIPPED | OUTPATIENT
Start: 2019-04-08

## 2019-04-08 RX ORDER — OXYCODONE HYDROCHLORIDE 5 MG/1
5-10 TABLET ORAL EVERY 6 HOURS PRN
Status: DISCONTINUED | OUTPATIENT
Start: 2019-04-08 | End: 2019-04-08 | Stop reason: HOSPADM

## 2019-04-08 RX ORDER — OXYCODONE HYDROCHLORIDE 5 MG/1
5-10 TABLET ORAL EVERY 6 HOURS PRN
Qty: 50 TABLET | Refills: 0 | Status: SHIPPED | OUTPATIENT
Start: 2019-04-08

## 2019-04-08 RX ORDER — QUETIAPINE FUMARATE 50 MG/1
50 TABLET, FILM COATED ORAL AT BEDTIME
COMMUNITY
Start: 2019-04-08

## 2019-04-08 RX ORDER — FLUMAZENIL 0.1 MG/ML
0.2 INJECTION, SOLUTION INTRAVENOUS
Status: CANCELLED | OUTPATIENT
Start: 2019-04-08

## 2019-04-08 RX ORDER — OMEPRAZOLE 20 MG/1
20 TABLET, DELAYED RELEASE ORAL DAILY
COMMUNITY
Start: 2019-04-08

## 2019-04-08 RX ORDER — POLYETHYLENE GLYCOL 3350 17 G/17G
17 POWDER, FOR SOLUTION ORAL 2 TIMES DAILY PRN
Qty: 60 PACKET | Refills: 0 | Status: SHIPPED | OUTPATIENT
Start: 2019-04-08

## 2019-04-08 RX ORDER — NALOXONE HYDROCHLORIDE 0.4 MG/ML
.1-.4 INJECTION, SOLUTION INTRAMUSCULAR; INTRAVENOUS; SUBCUTANEOUS
Status: CANCELLED | OUTPATIENT
Start: 2019-04-08

## 2019-04-08 RX ORDER — DOCUSATE SODIUM 100 MG/1
100 CAPSULE, LIQUID FILLED ORAL 2 TIMES DAILY
Status: DISCONTINUED | OUTPATIENT
Start: 2019-04-08 | End: 2019-04-08 | Stop reason: HOSPADM

## 2019-04-08 RX ADMIN — PANTOPRAZOLE SODIUM 40 MG: 40 TABLET, DELAYED RELEASE ORAL at 07:39

## 2019-04-08 RX ADMIN — OXYCODONE HYDROCHLORIDE 10 MG: 5 TABLET ORAL at 04:50

## 2019-04-08 RX ADMIN — LEVOTHYROXINE SODIUM 75 MCG: 75 TABLET ORAL at 07:39

## 2019-04-08 RX ADMIN — GABAPENTIN 600 MG: 600 TABLET, FILM COATED ORAL at 07:39

## 2019-04-08 RX ADMIN — OXYCODONE HYDROCHLORIDE 10 MG: 5 TABLET ORAL at 00:46

## 2019-04-08 RX ADMIN — DOCUSATE SODIUM 100 MG: 100 CAPSULE, LIQUID FILLED ORAL at 09:44

## 2019-04-08 RX ADMIN — ATENOLOL 50 MG: 50 TABLET ORAL at 07:39

## 2019-04-08 RX ADMIN — ACETAMINOPHEN 650 MG: 325 TABLET, FILM COATED ORAL at 03:14

## 2019-04-08 ASSESSMENT — ACTIVITIES OF DAILY LIVING (ADL)
ADLS_ACUITY_SCORE: 11

## 2019-04-08 ASSESSMENT — PAIN DESCRIPTION - DESCRIPTORS
DESCRIPTORS: ACHING
DESCRIPTORS: DISCOMFORT

## 2019-04-08 NOTE — PLAN OF CARE
AVSS. Pain controlled w/ PRN oxycodone, tylenol. Tolerating regular diet. Denies nausea. Voiding spontaneously, remind pt to save urine in hat. BM 4/6, passing gas. Up independently. PIV SL. Continue POC

## 2019-04-08 NOTE — PROGRESS NOTES
Afebrile, VSS, Pt is alert & oriented x 4.  Regular diet=good po, No complaints of nausea.  Oxycodone and tylenol providing good relief for her abdominal pain. Up independently in room.   Possible discontinue tomorrow.

## 2019-04-08 NOTE — PROGRESS NOTES
GYN ONC PROGRESS NOTE    HD#5 admitted with a small bowel obstruction  Disease: endometriosis    24 hour events:   - Tolerating regular diet  - ongoing bowel function  - pain well controlled     Subjective: Clarissa is feeling improved, but still ongoing right sided adbominal pain.  Wants to know plan for discharge and bowel regimen.  Would like a letter for work.  She is tolerating a regular diet and tolerating full meals without nausea or vomiting. Pain is well controlled. She has had a bowel movement and is passing flatus.  Ambulating without issue.  Voiding spontaneously.    Objective:   Vitals:    04/07/19 1936 04/07/19 2200 04/08/19 0339 04/08/19 0623   BP: 119/68 113/65 113/72 113/76   BP Location: Right arm Right arm Right arm Right arm   Pulse: 71 77     Resp: 16 15 14 16   Temp: 98  F (36.7  C) 98.1  F (36.7  C) 97.9  F (36.6  C) 97.1  F (36.2  C)   TempSrc: Oral Oral Oral Oral   SpO2: 96% 94% 95% 95%   Weight:       Height:         General: No acute distress, resting comfortably   CV: RRR, no murmur   Resp: clear to ascultation bilaterally  Abdomen: well healed vertical midline incision and horizontal 4-5 cm incision at site of previous colostomy. Soft, non-distended, minor distention on right abdomen, minimally tender   Extremities: well perfused, trace LE edema    I/O last 3 completed shifts:  In: 440 [P.O.:440]  Out: 600 [Urine:600]    Labs:  None new    Assessment: Clarissa Myles is a 43 year old female with endometriosis and history of multiple bowel surgeries now HD#5 admitted with a bowel obstruction.     Active Problem List  Endometriosis with pelvic pain  Bowel obstruction  Hypothyroidism  Hypertension  Anxiety     Plan:   1) Partial Bowel Obstruction: now resolved. S/p NG tube and conservative management. Suspect due to endometriosis implants on the bowel.  - Regular diet  - Pain: continue PO ibuprofen, tylenol and oxycodone   - PPX: SCDs, lovenox 40 mg daily  2) Endometriosis w/ pelvic pain:  Currently on Lupron - working well.  - tylenol, ibuprofen and oxycodone. S/p PCA  - Possibly planning hysterectomy and BSO for management of endometriosis with Dr. Franco at later date  3) Shortness of breath: resolved.   - CT PE negative on 4/4, incidental bilateral round breast lesions noted, recommended further evaluation or MRI  4) Hypothyroidism: continue home levothyroxine  5) Hypertension: continue home atenolol  6) anxiety: Continue home Seroquel and benadryl prn     Dispo: discharge to home today with follow-up with PCP for management of endometriosis, vs f/u with Dr. Franco in clinic  Patient staffed with Dr. Madrid.   Geraldine Love MD MPH  OB/GYN, PGY4  Pager: [phone]  4/8/2019    Provider Disclosure:   I agree with above History, Review of Systems, Physical exam and Plan. I have reviewed the content of the documentation and have edited it as needed. I have personally performed the services documented here and the documentation accurately represents those services and the decisions I have made.     Electronically signed by:   Chanda Madrid MD   Gynecologic Oncology   [facility]

## 2019-04-08 NOTE — PLAN OF CARE
Patient ready to discharge home, reviewed orders, family will transport. Tolerating diet, had a BM, passing gas, pain managed with oxycodone.

## 2019-04-09 ENCOUNTER — ANESTHESIA (OUTPATIENT)
Dept: SURGERY | Facility: CLINIC | Age: 44
End: 2019-04-09

## 2019-04-10 ENCOUNTER — CARE COORDINATION (OUTPATIENT)
Dept: ONCOLOGY | Facility: CLINIC | Age: 44
End: 2019-04-10

## 2019-04-10 NOTE — PROGRESS NOTES
RN reached out to patient for post hospital call.     Patient unavailabale. Message left for patient.     Itzel Davis RN

## 2019-04-16 ENCOUNTER — TELEPHONE (OUTPATIENT)
Dept: ONCOLOGY | Facility: CLINIC | Age: 44
End: 2019-04-16

## 2019-04-16 NOTE — TELEPHONE ENCOUNTER
"Patient called RN to discuss needing refill of her Oxycodone. Patient states Tylenol doesn't cut it for pain and Ibuprofen \"wrecks her gut\".     She contacted her local PCP and they refused to fill the medication. They asked her to contact us.     Patient states she has chronic pain and would like a refill. Patient could not describe any other symptoms and rate pain. Just states it hurts.     RN explained that Dr Franco is not in clinic and we would have to mail out the hard copy of the Rx if the MD was willing to write for this. RN explained she may not even be able to get this filled as we did not do surgery and we are not managing her chronic pain.     Patient stated a paper copy of Rx was fine to be mailed. She gave verbal understanding that it would take RN up to a few days to even get an approval on this.     Patient states that she just needs a refill.     RN put page out to MD to discuss.     Itzel Davis RN    "

## 2019-05-07 ENCOUNTER — TELEPHONE (OUTPATIENT)
Dept: ONCOLOGY | Facility: CLINIC | Age: 44
End: 2019-05-07

## 2019-05-07 NOTE — TELEPHONE ENCOUNTER
RN contacted patient due to Dr Franco's schedule change. Patient stated she was looking to move this appointment to May 16th anyway.     Patient re-scheduled.     Patient also requested that records be faxed to [facility] so that they will approve and pay for the trip and visit.     [facility] [phone] ATTN: Jessica Garcia     This was completed.     Itzel Davis RN

## 2019-05-07 NOTE — TELEPHONE ENCOUNTER
RN attempted to reach patient due to needing to reschedule either appointment time but potentially both date and time.     RN unable to reach either the patient or her emergency contact. Also due to full mailboxes RN was unable to leave messages.     Itzel Davis RN

## 2019-05-14 ENCOUNTER — MEDICAL CORRESPONDENCE (OUTPATIENT)
Dept: HEALTH INFORMATION MANAGEMENT | Facility: CLINIC | Age: 44
End: 2019-05-14

## 2019-05-16 ENCOUNTER — ONCOLOGY VISIT (OUTPATIENT)
Dept: ONCOLOGY | Facility: CLINIC | Age: 44
End: 2019-05-16
Attending: OBSTETRICS & GYNECOLOGY
Payer: COMMERCIAL

## 2019-05-16 VITALS
TEMPERATURE: 98.3 F | SYSTOLIC BLOOD PRESSURE: 146 MMHG | HEIGHT: 67 IN | HEART RATE: 78 BPM | WEIGHT: 147.8 LBS | DIASTOLIC BLOOD PRESSURE: 84 MMHG | OXYGEN SATURATION: 100 % | RESPIRATION RATE: 14 BRPM | BODY MASS INDEX: 23.2 KG/M2

## 2019-05-16 DIAGNOSIS — F41.9 ANXIETY: Primary | ICD-10-CM

## 2019-05-16 DIAGNOSIS — N80.9 ENDOMETRIOSIS: ICD-10-CM

## 2019-05-16 PROCEDURE — 99214 OFFICE O/P EST MOD 30 MIN: CPT | Mod: ZP | Performed by: OBSTETRICS & GYNECOLOGY

## 2019-05-16 PROCEDURE — G0463 HOSPITAL OUTPT CLINIC VISIT: HCPCS | Mod: ZF

## 2019-05-16 RX ORDER — LORAZEPAM 1 MG/1
1 TABLET ORAL EVERY 6 HOURS PRN
Qty: 30 TABLET | Refills: 0 | Status: SHIPPED | OUTPATIENT
Start: 2019-05-16

## 2019-05-16 ASSESSMENT — PAIN SCALES - GENERAL: PAINLEVEL: SEVERE PAIN (6)

## 2019-05-16 ASSESSMENT — MIFFLIN-ST. JEOR: SCORE: 1368.17

## 2019-05-16 NOTE — LETTER
2019       RE: Clarissa Myles   [address]  [facility] [facility], [place] [address]     Dear Colleague,    Thank you for referring your patient, Clarissa Myles, to the [facility]. Please see a copy of my visit note below.                              GYNECOLOGY ONCOLOGY CLINIC NOTE         Dr. Deras Not In System      RE: Clarissa Myles  : 1975  SARAH: 2019        Clarissa Myles is 43 year old patient who presents for follow up regarding endometriosis and history of bowel obstruction due to endometriosis. She presents with her brother and sister-in-law for the consult.    She was initially seen for her first consult in Gynecology Oncology Clinic on 2019. At that time she was admitted from 2019 to 2019. See that admission summary.  She had small bowel obstruction attributed to her endometriosis and prior history of bowel surgery, this resolved and she opted for conservative management.    Since that hospital stay, she reports her bowel function has progressively improved. She thinks the Lupron shot is working, her appetite has improve, pain decreased and she is gaining weight.    Her gynecologist was concerned about the long term risk of Lupron. The patient and her family would like to coordinate Gynecology care locally. In addition she identifies a need to deal with her mental health including which she has neglected due to the severity of the pain associated with her endometriosis.    Today she reports some mood changes but no suicidal ideation    Past Gyn Hx:    Her history is notable for long standing diagnosis of pelvic pain and dysmenorrhea. She , her child was born many years ago and she had secondary infertility. Starting early 2018 she began to experience increasing signs of bowel obstruction, a  lower GI endoscopy in summer 2018 was unsuccessful. Ultimately in 2018 she underwent bowel resection and colostomy, diagnosed with endometriosis.     In 2018, she underwent  another surgery with take down of the colostomy and additional bowel resection. Extensive endometriosis on her bowel was noted at that time and she was referred to Gynecology. Lupon was started at the end of Dec 2018. She reports her dysmenorrhea has improved since starting the Lupron.  Last week she started to develop nausea and abdominal pain. She was hospitalized at local hospital, placed NPO and ultimately discharged with plan to follow up at the [facility]. There was concern for another developing bowel obstruction thought to be due to extensive endometriosis on her bowel. Due to her surgical history and endometriosis, she was referred to Gyn Oncology at [facility] to discuss surgical management for endometriosis with total hysterectomy and bilateral salpingo-oophorectomy.      4/4/2019 CT scan of abdomen and pelvis  Impression: In this patient with stage IV endometriosis:  1. Focally dilated loop of stool-filled sigmoid colon immediately  proximal to the anastomotic suture line in the pelvis. Oral contrast  is demonstrated passing distal to this anastomosis, suggesting partial  mechanical bowel obstruction.  2. Heterogeneously thickened endometrium with possible internal mass,  measuring approximately 2.9 cm, consider sonohysterogram to further  evaluation.   3. Bilateral ovarian cysts.  4. Inflammatory changes in the left mid abdominal wall, possibly  related to subcutaneous medication injection.      Review of Systems:    Systemic           yes - weight gain, no fever  Skin           no rashes, or lesions  Eye           no irritation; no changes in vision  Donnell-Laryngeal           no dysphagia; no hoarseness   Pulmonary    no cough; no shortness of breath  Cardiovascular    no chest pain; no palpitations  Gastrointestinal    No nausea or vomiting. No constipation or diarrhea  Genitourinary   no urinary frequency; no urinary urgency; no dysuria; no pain; no abnormal vaginal discharge; no abnormal vaginal bleeding  Breast  "   no breast discharge; no breast changes; no breast pain  Musculoskeletal    no myalgias; no arthralgias; no back pain  Psychiatric           yes - anxiety, diagnosis of bipolar disorder currently denies symptoms but not taking medication    Hematologic           no tender lymph nodes; no noticeable swellings or lumps   Endocrine    no hot flashes; no heat/cold intolerance         Neurological   no tremor; no numbness and tingling; no headaches; no difficulty  sleeping      Past Medical History:    Past Medical History:   Diagnosis Date     Asthma      Bipolar disorder (H)      Endometriosis      GERD (gastroesophageal reflux disease)      Hypertension      Hypothyroid          Past Surgical History:    No past surgical history on file. Bowel resection and colostomy takedown 2018      Health Maintenance:  Health Maintenance Due   Topic Date Due     PREVENTIVE CARE VISIT  1975     PAP  1975     HIV SCREENING  08/08/1992     DTAP/TDAP/TD IMMUNIZATION (1 - Tdap) 08/08/2002     PHQ-2  01/01/2019           Current Medications:     has a current medication list which includes the following prescription(s): atenolol, docusate sodium, gabapentin, levothyroxine, lorazepam, naloxone, ondansetron, polyethylene glycol, quetiapine, acetaminophen, omeprazole, and oxycodone.       Allergies:     No Known Allergies         Social History:     Social History     Tobacco Use     Smoking status: Former Smoker     Smokeless tobacco: Never Used   Substance Use Topics     Alcohol use: No     Frequency: Never       History   Drug Use Not on file         Family History:       No family history on file. Denies family history of breast or ovarian cancer      Physical Exam:     /84 (BP Location: Right arm, Cuff Size: Adult Regular)   Pulse 78   Temp 98.3  F (36.8  C) (Oral)   Resp 14   Ht 1.702 m (5' 7.01\")   Wt 67 kg (147 lb 12.8 oz)   SpO2 100%   BMI 23.14 kg/m     Body mass index is 23.14 kg/m .    General " Appearance:  alert, no distress, fatigued and does not make eye contact     Neurological: normal gait, no gross defects     Psychiatric: appropriate mood and affect, soft spoken              Assessment:    Clarissa Myles is a 43 year old woman with long standing history of endometriosis and bowel obstruction due to endometriosis. Her symptoms have significantly improved since starting Lupron in Dec 2018.    1. Stage IV endometriosis involving bowel s/p surgical resection. At this time she is doing well on Lupron. She should continue with close follow up under Gyn. It will be important to carefully consider risk and benefit of discontinuing Lupron given the extent of her prior symptoms. Add back estrogen can be attempted under close observation per local Gyn specialist.      2. History of bipolar disorder- currently in need for assessment and management by trained mental health practitioner. The patient and her family hope they can locate a skilled psychiatrist close to home.    Follow up in Gyn Onc clinic as needed or if surgery for the endometriosis is being considered.      Thank you for allowing us to participate in the care of your patient.         Sincerely,      Jennyfer Franco M.D., MPH,  F.A.C.O.G.  Division of Gynecologic Oncology        A total of 25 minutes was spent with the patient, 50% of time was spent in counseling the patient and/or treatment planning.        Again, thank you for allowing me to participate in the care of your patient.      Sincerely,    Jennyfer Franco MD    CC  Patient Care Team:  Megan Zhao MD as PCP - General  PROVIDER NOT IN SYSTEM

## 2019-05-16 NOTE — NURSING NOTE
"Oncology Rooming Note    May 16, 2019 5:43 PM   Clarissa Myles is a 43 year old female who presents for:    Chief Complaint   Patient presents with     Oncology Clinic Visit     [facility] RETURN- BOWEL OBSTRUCTION     Initial Vitals: /84 (BP Location: Right arm, Cuff Size: Adult Regular)   Pulse 78   Temp 98.3  F (36.8  C) (Oral)   Resp 14   Ht 1.702 m (5' 7.01\")   Wt 67 kg (147 lb 12.8 oz)   SpO2 100%   BMI 23.14 kg/m   Estimated body mass index is 23.14 kg/m  as calculated from the following:    Height as of this encounter: 1.702 m (5' 7.01\").    Weight as of this encounter: 67 kg (147 lb 12.8 oz). Body surface area is 1.78 meters squared.  Severe Pain (6) Comment: Data Unavailable   No LMP recorded.  Allergies reviewed: Yes  Medications reviewed: Yes    Medications: MEDICATION REFILLS NEEDED TODAY. Provider was notified.  Pharmacy name entered into EPIC: [facility]     Clinical concerns: Refill on Zofran and Gabapentin. Jeromere was notified.      Caio Cotter LPN            "

## 2019-06-04 NOTE — PROGRESS NOTES
GYNECOLOGY ONCOLOGY CLINIC NOTE         Dr. Deras Not In System      RE: Clarissa Myles  : 1975  SARAH: 2019        Clarissa Myles is 43 year old patient who presents for follow up regarding endometriosis and history of bowel obstruction due to endometriosis. She presents with her brother and sister-in-law for the consult.    She was initially seen for her first consult in Gynecology Oncology Clinic on 2019. At that time she was admitted from 2019 to 2019. See that admission summary.  She had small bowel obstruction attributed to her endometriosis and prior history of bowel surgery, this resolved and she opted for conservative management.    Since that hospital stay, she reports her bowel function has progressively improved. She thinks the Lupron shot is working, her appetite has improve, pain decreased and she is gaining weight.    Her gynecologist was concerned about the long term risk of Lupron. The patient and her family would like to coordinate Gynecology care locally. In addition she identifies a need to deal with her mental health including which she has neglected due to the severity of the pain associated with her endometriosis.    Today she reports some mood changes but no suicidal ideation    Past Gyn Hx:    Her history is notable for long standing diagnosis of pelvic pain and dysmenorrhea. She , her child was born many years ago and she had secondary infertility. Starting early 2018 she began to experience increasing signs of bowel obstruction, a  lower GI endoscopy in summer 2018 was unsuccessful. Ultimately in 2018 she underwent bowel resection and colostomy, diagnosed with endometriosis.     In 2018, she underwent another surgery with take down of the colostomy and additional bowel resection. Extensive endometriosis on her bowel was noted at that time and she was referred to Gynecology. Lupon was started at the end of Dec 2018. She  reports her dysmenorrhea has improved since starting the Lupron.  Last week she started to develop nausea and abdominal pain. She was hospitalized at local hospital, placed NPO and ultimately discharged with plan to follow up at the [facility]. There was concern for another developing bowel obstruction thought to be due to extensive endometriosis on her bowel. Due to her surgical history and endometriosis, she was referred to Gyn Oncology at [facility] to discuss surgical management for endometriosis with total hysterectomy and bilateral salpingo-oophorectomy.      4/4/2019 CT scan of abdomen and pelvis  Impression: In this patient with stage IV endometriosis:  1. Focally dilated loop of stool-filled sigmoid colon immediately  proximal to the anastomotic suture line in the pelvis. Oral contrast  is demonstrated passing distal to this anastomosis, suggesting partial  mechanical bowel obstruction.  2. Heterogeneously thickened endometrium with possible internal mass,  measuring approximately 2.9 cm, consider sonohysterogram to further  evaluation.   3. Bilateral ovarian cysts.  4. Inflammatory changes in the left mid abdominal wall, possibly  related to subcutaneous medication injection.      Review of Systems:    Systemic           yes - weight gain, no fever  Skin           no rashes, or lesions  Eye           no irritation; no changes in vision  Donnell-Laryngeal           no dysphagia; no hoarseness   Pulmonary    no cough; no shortness of breath  Cardiovascular    no chest pain; no palpitations  Gastrointestinal    No nausea or vomiting. No constipation or diarrhea  Genitourinary   no urinary frequency; no urinary urgency; no dysuria; no pain; no abnormal vaginal discharge; no abnormal vaginal bleeding  Breast    no breast discharge; no breast changes; no breast pain  Musculoskeletal    no myalgias; no arthralgias; no back pain  Psychiatric           yes - anxiety, diagnosis of bipolar disorder currently denies symptoms but not  "taking medication    Hematologic           no tender lymph nodes; no noticeable swellings or lumps   Endocrine    no hot flashes; no heat/cold intolerance         Neurological   no tremor; no numbness and tingling; no headaches; no difficulty  sleeping      Past Medical History:    Past Medical History:   Diagnosis Date     Asthma      Bipolar disorder (H)      Endometriosis      GERD (gastroesophageal reflux disease)      Hypertension      Hypothyroid          Past Surgical History:    No past surgical history on file. Bowel resection and colostomy takedown 2018      Health Maintenance:  Health Maintenance Due   Topic Date Due     PREVENTIVE CARE VISIT  1975     PAP  1975     HIV SCREENING  08/08/1992     DTAP/TDAP/TD IMMUNIZATION (1 - Tdap) 08/08/2002     PHQ-2  01/01/2019           Current Medications:     has a current medication list which includes the following prescription(s): atenolol, docusate sodium, gabapentin, levothyroxine, lorazepam, naloxone, ondansetron, polyethylene glycol, quetiapine, acetaminophen, omeprazole, and oxycodone.       Allergies:     No Known Allergies         Social History:     Social History     Tobacco Use     Smoking status: Former Smoker     Smokeless tobacco: Never Used   Substance Use Topics     Alcohol use: No     Frequency: Never       History   Drug Use Not on file         Family History:       No family history on file. Denies family history of breast or ovarian cancer      Physical Exam:     /84 (BP Location: Right arm, Cuff Size: Adult Regular)   Pulse 78   Temp 98.3  F (36.8  C) (Oral)   Resp 14   Ht 1.702 m (5' 7.01\")   Wt 67 kg (147 lb 12.8 oz)   SpO2 100%   BMI 23.14 kg/m    Body mass index is 23.14 kg/m .    General Appearance:  alert, no distress, fatigued and does not make eye contact     Neurological: normal gait, no gross defects     Psychiatric: appropriate mood and affect, soft spoken              Assessment:    Clarissa Myles is a 43 " year old woman with long standing history of endometriosis and bowel obstruction due to endometriosis. Her symptoms have significantly improved since starting Lupron in Dec 2018.    1. Stage IV endometriosis involving bowel s/p surgical resection. At this time she is doing well on Lupron. She should continue with close follow up under Gyn. It will be important to carefully consider risk and benefit of discontinuing Lupron given the extent of her prior symptoms. Add back estrogen can be attempted under close observation per local Gyn specialist.      2. History of bipolar disorder- currently in need for assessment and management by trained mental health practitioner. The patient and her family hope they can locate a skilled psychiatrist close to home.    Follow up in Gyn Onc clinic as needed or if surgery for the endometriosis is being considered.      Thank you for allowing us to participate in the care of your patient.         Sincerely,      Jennyfer Franco M.D., MPH,  F.A.C.O.G.  Division of Gynecologic Oncology        A total of 25 minutes was spent with the patient, 50% of time was spent in counseling the patient and/or treatment planning.              CC  Patient Care Team:  Megan Zhao MD as PCP - General  PROVIDER NOT IN SYSTEM

## 2019-10-11 ENCOUNTER — TELEPHONE (OUTPATIENT)
Dept: SURGERY | Facility: CLINIC | Age: 44
End: 2019-10-11

## 2019-10-11 DIAGNOSIS — K63.9 BOWEL DISEASE: Primary | ICD-10-CM

## 2019-10-11 NOTE — TELEPHONE ENCOUNTER
I was on a shared call with a provider at [facility] in [place], [place] as well as the Colorectal Surgeon on call at [facility] regarding this patient. She is a patient known to us of Dr. Franco's who has known endometriosis which has caused multiple bowel issues necessitating a partial colectomy in the past. 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 She presented locally with increasing symptoms related to said stricture. They had called regarding a possible transfer.     Although her bowel concerns all began because of endometriosis, her current issue is solely bowel in nature. Because of this, I did not feel comfortable accepting her directly as a Gynecologic Oncology transfer (which requires a financial override because the patient is self-pay), as she doesn't have an urgent Gyn issue. As such, the decision for financial override is up to the colorectal service. Their opinion is that if her clinical status is such that she needs a relatively urgent procedure, she should undergo a diverting ostomy as a temporizing measure and then at some later date when she has recovered, a more definitive surgery could be performed by Gyn Onc with assistance of colorectal if needed to perform a revision of her ostomy or conversion to permanent colostomy. If she does not need an urgent surgery, then definitive surgery could be deferred until a planned co-case could be arranged. There is the possibility of her coming to Gyn Onc service and coordinating a surgery in the coming days; however, again, I have a very difficult time justifying that as medically necessary for weekend transport from [place] if the patient is stable and able to have her acute problem corrected there, and I am hesitant to accept someone with a complex history of bowel surgery by colorectal surgery to a gynecologic service for an acute bowel issue, as even if I take her to the OR I would likely request the assistance of colorectal  surgery.     Ultimately, the plan was made that the patient will remain in [place] and if surgery is needed she will undergo a temporizing procedure there. If definitive hyst-bso is decided upon, that can be arranged at a later time. We can have Dr. Franco's clinic follow-up with the patient to arrange future follow-up with the patient if she desires.     Jorge Crook MD    Gynecologic Oncology

## 2019-10-14 ENCOUNTER — TELEPHONE (OUTPATIENT)
Dept: ONCOLOGY | Facility: CLINIC | Age: 44
End: 2019-10-14

## 2019-10-14 NOTE — TELEPHONE ENCOUNTER
RN reached out to patient to return patients voicemail.     Patient had surgery in [place] and was given a colostomy.     She is feeling better and would like to arrange follow up with Dr Franco.     Appt date and time given to patient. Once made she would like this faxed to her so she can turn it into [facility] so that they will cover both her trip and visit here to the [facility].     Patient fax number: [phone]    Itzel Davis RN

## 2019-12-13 ENCOUNTER — TELEPHONE (OUTPATIENT)
Dept: ONCOLOGY | Facility: CLINIC | Age: 44
End: 2019-12-13

## 2019-12-13 NOTE — TELEPHONE ENCOUNTER
"Patient contacted RN stating that she is a patient of Dr Robles and she is prescribed Oxycodone for \"extreme pain\". Patient states she has a regular provider that is currently prescribing this for her but this provider is asking if Dr Franco would be willing to write for this.    RN and patient discussed that Gyn/Onc providers do not write these medications for patients unless we see them on a regular bases. As of now patient has not been seen since May and is set for a follow up appointment in February.     RN also discussed that unless she is seeing Dr Franco on a regular basis we will not be taking over this medication. We also may decide that she needs palliative care help vs pain clinic help.     Patient gave verbal understanding and was appreciative of the RN time.     Itzel Davis RN    "

## 2020-05-11 ENCOUNTER — PATIENT OUTREACH (OUTPATIENT)
Dept: ONCOLOGY | Facility: CLINIC | Age: 45
End: 2020-05-11

## 2020-05-11 NOTE — PROGRESS NOTES
Patient just updated insurance information. Patient now on [employer]. Due to insurance the visit with Dr Franco will need to be delayed as this appointment will need to be approved. Current insurance requires patient stay within [place] boundaries for care unless approved. This process has been started.     Patient has not been on Lupron Injection as Dr Franco has suggested. Patient has upcoming appointment with local OBGYN. Patient will start with that appointment and see what that provider suggests about starting Lupron Injection again.     Patient also has upcoming appt with PCP.     Dr Franco appointment delayed until May 28th.     Itzel Davis RN

## 2020-06-25 ENCOUNTER — PATIENT OUTREACH (OUTPATIENT)
Dept: ONCOLOGY | Facility: CLINIC | Age: 45
End: 2020-06-25

## 2020-06-25 NOTE — PROGRESS NOTES
RN reached out to patient along with Financial counseling to no avail.     Patient does not have insurance approval ([employer]).     RN attempted to help patient reschedule but has bee unable to reach patient or leave voicemail's.     Itzel Davis RN

## 2021-01-03 ENCOUNTER — HEALTH MAINTENANCE LETTER (OUTPATIENT)
Age: 46
End: 2021-01-03

## 2021-10-10 ENCOUNTER — HEALTH MAINTENANCE LETTER (OUTPATIENT)
Age: 46
End: 2021-10-10

## 2022-01-29 ENCOUNTER — HEALTH MAINTENANCE LETTER (OUTPATIENT)
Age: 47
End: 2022-01-29

## 2022-09-18 ENCOUNTER — HEALTH MAINTENANCE LETTER (OUTPATIENT)
Age: 47
End: 2022-09-18

## 2023-05-07 ENCOUNTER — HEALTH MAINTENANCE LETTER (OUTPATIENT)
Age: 48
End: 2023-05-07

## 2023-10-08 ENCOUNTER — HEALTH MAINTENANCE LETTER (OUTPATIENT)
Age: 48
End: 2023-10-08